# Patient Record
Sex: FEMALE | Race: WHITE | NOT HISPANIC OR LATINO
[De-identification: names, ages, dates, MRNs, and addresses within clinical notes are randomized per-mention and may not be internally consistent; named-entity substitution may affect disease eponyms.]

---

## 2020-05-20 ENCOUNTER — TRANSCRIPTION ENCOUNTER (OUTPATIENT)
Age: 60
End: 2020-05-20

## 2020-07-10 ENCOUNTER — APPOINTMENT (OUTPATIENT)
Dept: OBGYN | Facility: CLINIC | Age: 60
End: 2020-07-10
Payer: COMMERCIAL

## 2020-07-10 VITALS
SYSTOLIC BLOOD PRESSURE: 130 MMHG | TEMPERATURE: 98.5 F | WEIGHT: 160 LBS | HEIGHT: 64 IN | BODY MASS INDEX: 27.31 KG/M2 | DIASTOLIC BLOOD PRESSURE: 80 MMHG

## 2020-07-10 DIAGNOSIS — Z86.2 PERSONAL HISTORY OF DISEASES OF THE BLOOD AND BLOOD-FORMING ORGANS AND CERTAIN DISORDERS INVOLVING THE IMMUNE MECHANISM: ICD-10-CM

## 2020-07-10 DIAGNOSIS — Z12.31 ENCOUNTER FOR SCREENING MAMMOGRAM FOR MALIGNANT NEOPLASM OF BREAST: ICD-10-CM

## 2020-07-10 DIAGNOSIS — Z86.79 PERSONAL HISTORY OF OTHER DISEASES OF THE CIRCULATORY SYSTEM: ICD-10-CM

## 2020-07-10 DIAGNOSIS — I49.1 ATRIAL PREMATURE DEPOLARIZATION: ICD-10-CM

## 2020-07-10 PROCEDURE — 99396 PREV VISIT EST AGE 40-64: CPT

## 2020-07-12 NOTE — CHIEF COMPLAINT
[Initial Visit] : initial GYN visit [FreeTextEntry1] : 61 y/o patient presents for annual visit with LMP: 2011. Pt states her last pap was "about a year and a half ago"

## 2020-07-12 NOTE — CHIEF COMPLAINT
[Initial Visit] : initial GYN visit [FreeTextEntry1] : 59 y/o patient presents for annual visit with LMP: 2011. Pt states her last pap was "about a year and a half ago"

## 2020-07-16 LAB — HPV HIGH+LOW RISK DNA PNL CVX: NOT DETECTED

## 2020-07-24 ENCOUNTER — TRANSCRIPTION ENCOUNTER (OUTPATIENT)
Age: 60
End: 2020-07-24

## 2021-12-10 ENCOUNTER — NON-APPOINTMENT (OUTPATIENT)
Age: 61
End: 2021-12-10

## 2021-12-21 ENCOUNTER — APPOINTMENT (OUTPATIENT)
Dept: SURGICAL ONCOLOGY | Facility: CLINIC | Age: 61
End: 2021-12-21
Payer: COMMERCIAL

## 2021-12-21 ENCOUNTER — NON-APPOINTMENT (OUTPATIENT)
Age: 61
End: 2021-12-21

## 2021-12-21 ENCOUNTER — APPOINTMENT (OUTPATIENT)
Dept: OBGYN | Facility: CLINIC | Age: 61
End: 2021-12-21
Payer: COMMERCIAL

## 2021-12-21 VITALS
HEIGHT: 64 IN | BODY MASS INDEX: 28.17 KG/M2 | OXYGEN SATURATION: 98 % | HEART RATE: 85 BPM | SYSTOLIC BLOOD PRESSURE: 116 MMHG | DIASTOLIC BLOOD PRESSURE: 84 MMHG | WEIGHT: 165 LBS

## 2021-12-21 VITALS
SYSTOLIC BLOOD PRESSURE: 110 MMHG | WEIGHT: 178 LBS | HEIGHT: 64 IN | BODY MASS INDEX: 30.39 KG/M2 | DIASTOLIC BLOOD PRESSURE: 80 MMHG

## 2021-12-21 DIAGNOSIS — K58.9 IRRITABLE BOWEL SYNDROME W/OUT DIARRHEA: ICD-10-CM

## 2021-12-21 DIAGNOSIS — Z87.19 PERSONAL HISTORY OF OTHER DISEASES OF THE DIGESTIVE SYSTEM: ICD-10-CM

## 2021-12-21 DIAGNOSIS — Z86.59 PERSONAL HISTORY OF OTHER MENTAL AND BEHAVIORAL DISORDERS: ICD-10-CM

## 2021-12-21 PROCEDURE — 99396 PREV VISIT EST AGE 40-64: CPT

## 2021-12-21 PROCEDURE — 99204 OFFICE O/P NEW MOD 45 MIN: CPT

## 2021-12-21 RX ORDER — CLONAZEPAM 0.5 MG/1
0.5 TABLET ORAL
Refills: 0 | Status: ACTIVE | COMMUNITY

## 2021-12-21 RX ORDER — ASPIRIN 81 MG/1
81 TABLET, COATED ORAL
Refills: 0 | Status: ACTIVE | COMMUNITY

## 2021-12-21 RX ORDER — HYDROXYUREA 500 MG/1
500 CAPSULE ORAL
Refills: 0 | Status: ACTIVE | COMMUNITY

## 2021-12-21 NOTE — HISTORY OF PRESENT ILLNESS
[FreeTextEntry1] : 60 yo  with LMP age 51 for annual\par \par CC: lower abdominal pain and cramping. concern over other problem\par \par Known IBS feels different\par Known hemangioma of liver 5 x 5 x 7 cm noted on CT scan. Will be having surgery with Dr Willis Zarco.\par \par OB:\par c/s x 3 (ovary wrapped and behind uterus)\par Fibroid uterus - possible reason for c/s\par \par No HRT\par Menopause 54\par Menarche 13\par \par \par BMs ~2-3\par No CASSI nor changes in urination\par \par  [ColonoscopyDate] : 11/3 2020 [TextBox_43] : serrated aenoma, sessile right sided

## 2021-12-21 NOTE — PHYSICAL EXAM
[Labia Majora] : normal [Labia Minora] : normal [Normal] : normal [Uterine Adnexae] : normal [FreeTextEntry9] : Ramin negative

## 2021-12-21 NOTE — DISCUSSION/SUMMARY
[FreeTextEntry1] : Health Maintenance:\par -Pap with HPV\par -Mammo Rx\par -TBSE\par -colonoscopy guidelines reviewed with patient\par -Achieve Vit D levels of 30-40, intake of 1100 mg daily calcium mostly thru dark green\par leafy greens and milk products, exercise 30 minutes TIW\par \par IBS / lower abdominal pain:\par Feeling mild cramping\par Reviewed CT scan\par US pelvis ordered\par Continue gluten free diet which has helped\par Will have telemed visit for review preferable before her liver surgery

## 2022-01-03 NOTE — HISTORY OF PRESENT ILLNESS
[de-identified] : Ms. ARNOLD BERG is a 61 year old female who presents today for an initial consultation. PMH of HTN, HLD, IBS, polycythemia vera who was recently seen by her GI with complaints of abdominal gas pain and diarrhea and underwent a CT 11/16/21 that noted a prominent cyst in the right lobe of the liver measuring 5.2 x 5.5 x 7.2 cm causing mass effect on the adjacent structures and causing biliary dilation primarily in the right hepatic lobe. Low attenuating focus in the left hepatic lobe which corresponds to the MR finding from 2010 of a hemangioma. Colonic diverticulosis. No bowel wall thickening or inflammatory changes. Small noninflamed fat-containing umbilical hernia. 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 2 mm pancreatic neck cystic focus and a cluster of 2 to 4 mm cystic focus in the tail without suspicious features may represent SBIPMN vs serous cystadenoma. \par \par She recently gave up gluten and her abdominal symptoms have almost completely resolved. She has not been allergy tested yet. FHx of paternal first cousin with breast cancer, paternal first cousin with PDAC, paternal first cousin with colon cancer.

## 2022-01-03 NOTE — ASSESSMENT
[FreeTextEntry1] : Ms. ARNOLD BERG is a 61 year old female who presents today for an initial consultation. PMH of HTN, HLD, IBS, polycythemia vera who was recently seen by her GI with complaints of abdominal gas pain and diarrhea. She actually recently gave up gluten and her abdominal symptoms have almost completely resolved.\par \par During this workup she underwent a CT 11/16/21 that I reviewed and demonstrates a prominent cyst in the right lobe of the liver measuring 5.2 x 5.5 x 7.2 cm causing mass effect on the adjacent structures and causing biliary dilation primarily in the right hepatic lobe. Low attenuating focus in the left hepatic lobe which corresponds to the MR finding from 2010 of a hemangioma. 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 Discussed that these are incredibly rare lesions that are most often benign, however, they do have the potential for malignant degeneration into biliary cystadenocarcinomas and the recommendation would be for resection.  I do believe that this was an incidental finding on workup and is unrelated to her symptoms which have resolved since eliminating gluten. \par \par Discussed that up to 80% of the liver can be removed and the rest of the liver will increase in size to the same volume, discussed that the majority of regeneration will be complete in about 3 weeks although a small portion of regeneration continues for another few months. Discussed that the risk of liver insufficiency is <2%. Discussed that the surgery will last 3-4 hours and the hospital stay will last about 5 days. Discussed that the most common complaint is fatigue for about 6-8 weeks. Risks, benefits and details of operation discussed. These include bleeding, infection, damage to surrounding structures, heart/lung failure, pneumonia, liver insufficiency, bilomas and blood clot to the lung. The patient wishes to move forward with the operation. Our office will call to schedule surgery. She has a hx of polycythemia vera and should see her hematologist for clearance prior to surgery. \par \par Today, I personally spent 60 minutes in total time including reviewing imaging and studies, discussing complex treatment regimens, direct face to face time with the patient, patient education and counseling.\par  \par

## 2022-03-07 ENCOUNTER — OUTPATIENT (OUTPATIENT)
Dept: OUTPATIENT SERVICES | Facility: HOSPITAL | Age: 62
LOS: 1 days | End: 2022-03-07
Payer: MEDICARE

## 2022-03-07 ENCOUNTER — RESULT REVIEW (OUTPATIENT)
Age: 62
End: 2022-03-07

## 2022-03-07 DIAGNOSIS — D12.6 BENIGN NEOPLASM OF COLON, UNSPECIFIED: ICD-10-CM

## 2022-03-07 PROCEDURE — 88321 CONSLTJ&REPRT SLD PREP ELSWR: CPT

## 2022-03-09 LAB — SURGICAL PATHOLOGY STUDY: SIGNIFICANT CHANGE UP

## 2022-03-11 ENCOUNTER — OUTPATIENT (OUTPATIENT)
Dept: OUTPATIENT SERVICES | Facility: HOSPITAL | Age: 62
LOS: 1 days | End: 2022-03-11
Payer: COMMERCIAL

## 2022-03-11 VITALS
HEART RATE: 180 BPM | OXYGEN SATURATION: 98 % | DIASTOLIC BLOOD PRESSURE: 80 MMHG | WEIGHT: 179.9 LBS | HEIGHT: 64 IN | SYSTOLIC BLOOD PRESSURE: 140 MMHG | RESPIRATION RATE: 16 BRPM | TEMPERATURE: 98 F

## 2022-03-11 DIAGNOSIS — D75.1 SECONDARY POLYCYTHEMIA: ICD-10-CM

## 2022-03-11 DIAGNOSIS — Z90.89 ACQUIRED ABSENCE OF OTHER ORGANS: Chronic | ICD-10-CM

## 2022-03-11 DIAGNOSIS — Z98.891 HISTORY OF UTERINE SCAR FROM PREVIOUS SURGERY: Chronic | ICD-10-CM

## 2022-03-11 DIAGNOSIS — K83.5 BILIARY CYST: ICD-10-CM

## 2022-03-11 DIAGNOSIS — D45 POLYCYTHEMIA VERA: ICD-10-CM

## 2022-03-11 LAB
ALBUMIN SERPL ELPH-MCNC: 4.3 G/DL — SIGNIFICANT CHANGE UP (ref 3.3–5)
ALP SERPL-CCNC: 81 U/L — SIGNIFICANT CHANGE UP (ref 40–120)
ALT FLD-CCNC: 14 U/L — SIGNIFICANT CHANGE UP (ref 4–33)
ANION GAP SERPL CALC-SCNC: 14 MMOL/L — SIGNIFICANT CHANGE UP (ref 7–14)
APTT BLD: 32.5 SEC — SIGNIFICANT CHANGE UP (ref 27–36.3)
AST SERPL-CCNC: 21 U/L — SIGNIFICANT CHANGE UP (ref 4–32)
BILIRUB SERPL-MCNC: 0.5 MG/DL — SIGNIFICANT CHANGE UP (ref 0.2–1.2)
BLD GP AB SCN SERPL QL: NEGATIVE — SIGNIFICANT CHANGE UP
BUN SERPL-MCNC: 18 MG/DL — SIGNIFICANT CHANGE UP (ref 7–23)
CALCIUM SERPL-MCNC: 9.7 MG/DL — SIGNIFICANT CHANGE UP (ref 8.4–10.5)
CHLORIDE SERPL-SCNC: 102 MMOL/L — SIGNIFICANT CHANGE UP (ref 98–107)
CO2 SERPL-SCNC: 24 MMOL/L — SIGNIFICANT CHANGE UP (ref 22–31)
CREAT SERPL-MCNC: 0.68 MG/DL — SIGNIFICANT CHANGE UP (ref 0.5–1.3)
EGFR: 99 ML/MIN/1.73M2 — SIGNIFICANT CHANGE UP
GLUCOSE SERPL-MCNC: 78 MG/DL — SIGNIFICANT CHANGE UP (ref 70–99)
HCT VFR BLD CALC: 42.8 % — SIGNIFICANT CHANGE UP (ref 34.5–45)
HGB BLD-MCNC: 14.4 G/DL — SIGNIFICANT CHANGE UP (ref 11.5–15.5)
INR BLD: 0.95 RATIO — SIGNIFICANT CHANGE UP (ref 0.88–1.16)
MCHC RBC-ENTMCNC: 31.8 PG — SIGNIFICANT CHANGE UP (ref 27–34)
MCHC RBC-ENTMCNC: 33.6 GM/DL — SIGNIFICANT CHANGE UP (ref 32–36)
MCV RBC AUTO: 94.5 FL — SIGNIFICANT CHANGE UP (ref 80–100)
NRBC # BLD: 0 /100 WBCS — SIGNIFICANT CHANGE UP
NRBC # FLD: 0 K/UL — SIGNIFICANT CHANGE UP
PLATELET # BLD AUTO: 440 K/UL — HIGH (ref 150–400)
POTASSIUM SERPL-MCNC: 4.5 MMOL/L — SIGNIFICANT CHANGE UP (ref 3.5–5.3)
POTASSIUM SERPL-SCNC: 4.5 MMOL/L — SIGNIFICANT CHANGE UP (ref 3.5–5.3)
PROT SERPL-MCNC: 6.7 G/DL — SIGNIFICANT CHANGE UP (ref 6–8.3)
PROTHROM AB SERPL-ACNC: 11 SEC — SIGNIFICANT CHANGE UP (ref 10.5–13.4)
RBC # BLD: 4.53 M/UL — SIGNIFICANT CHANGE UP (ref 3.8–5.2)
RBC # FLD: 12.8 % — SIGNIFICANT CHANGE UP (ref 10.3–14.5)
RH IG SCN BLD-IMP: POSITIVE — SIGNIFICANT CHANGE UP
SODIUM SERPL-SCNC: 140 MMOL/L — SIGNIFICANT CHANGE UP (ref 135–145)
WBC # BLD: 5.73 K/UL — SIGNIFICANT CHANGE UP (ref 3.8–10.5)
WBC # FLD AUTO: 5.73 K/UL — SIGNIFICANT CHANGE UP (ref 3.8–10.5)

## 2022-03-11 PROCEDURE — 93010 ELECTROCARDIOGRAM REPORT: CPT

## 2022-03-11 NOTE — H&P PST ADULT - PROBLEM SELECTOR PLAN 1
Patient tentatively scheduled for  liver resection on 03/16/2022.  Pre-op instructions provided. Pt given verbal and written instructions with teach back on chlorhexidine shampoo and pepcid. Pt verbalized understanding with return demonstration.  Pt strongly advised to follow up with surgeon to discuss COVID testing requirements prior to procedure.     Requesting cardiology clearance due to Abnormal EKG at PST.  Pt was given Dr Zac Newton's ( cardiologist) office number as currently pt says she has no PCP or Cardiologist.

## 2022-03-11 NOTE — H&P PST ADULT - GASTROINTESTINAL COMMENTS
Pt states she had bad IBS , CT scan done and was showed Biliary cyst Pt states she had bad IBS , CT scan done and was revealed Biliary cyst Pre op dx- Biliary cyst

## 2022-03-11 NOTE — H&P PST ADULT - PROBLEM SELECTOR PLAN 2
Hematologist clearance copy in chart .   As per hematologist  pt stopped taking aspirin on 03/09/2022  Emailed Surgeon regarding hematologist's recommendation  to start Lovenox 40mg SQ daily while hospitalized ,  resume aspirin on discharge and If her hemoglobin is normal , she should resume hydroxyurea upon discharge .

## 2022-03-15 ENCOUNTER — TRANSCRIPTION ENCOUNTER (OUTPATIENT)
Age: 62
End: 2022-03-15

## 2022-03-15 NOTE — ASU PATIENT PROFILE, ADULT - FALL HARM RISK - UNIVERSAL INTERVENTIONS
Complex Repair And Flap Additional Text (Will Appearing After The Standard Complex Repair Text): The complex repair was not sufficient to completely close the primary defect. The remaining additional defect was repaired with the flap mentioned below. Bed in lowest position, wheels locked, appropriate side rails in place/Call bell, personal items and telephone in reach/Instruct patient to call for assistance before getting out of bed or chair/Non-slip footwear when patient is out of bed/[place] to call system/Physically safe environment - no spills, clutter or unnecessary equipment/Purposeful Proactive Rounding/Room/bathroom lighting operational, light cord in reach

## 2022-03-16 ENCOUNTER — APPOINTMENT (OUTPATIENT)
Dept: SURGICAL ONCOLOGY | Facility: HOSPITAL | Age: 62
End: 2022-03-16

## 2022-03-16 ENCOUNTER — INPATIENT (INPATIENT)
Facility: HOSPITAL | Age: 62
LOS: 3 days | Discharge: HOME | End: 2022-03-20
Attending: SURGERY | Admitting: SURGERY
Payer: COMMERCIAL

## 2022-03-16 ENCOUNTER — RESULT REVIEW (OUTPATIENT)
Age: 62
End: 2022-03-16

## 2022-03-16 VITALS
DIASTOLIC BLOOD PRESSURE: 92 MMHG | TEMPERATURE: 98 F | WEIGHT: 179.9 LBS | HEIGHT: 64 IN | RESPIRATION RATE: 16 BRPM | SYSTOLIC BLOOD PRESSURE: 140 MMHG | OXYGEN SATURATION: 96 % | HEART RATE: 76 BPM

## 2022-03-16 DIAGNOSIS — K83.5 BILIARY CYST: ICD-10-CM

## 2022-03-16 DIAGNOSIS — Z98.891 HISTORY OF UTERINE SCAR FROM PREVIOUS SURGERY: Chronic | ICD-10-CM

## 2022-03-16 DIAGNOSIS — Z90.89 ACQUIRED ABSENCE OF OTHER ORGANS: Chronic | ICD-10-CM

## 2022-03-16 LAB
ALBUMIN SERPL ELPH-MCNC: 3.7 G/DL — SIGNIFICANT CHANGE UP (ref 3.3–5)
ALBUMIN SERPL ELPH-MCNC: 4.3 G/DL — SIGNIFICANT CHANGE UP (ref 3.3–5)
ALP SERPL-CCNC: 70 U/L — SIGNIFICANT CHANGE UP (ref 40–120)
ALP SERPL-CCNC: 83 U/L — SIGNIFICANT CHANGE UP (ref 40–120)
ALT FLD-CCNC: 154 U/L — HIGH (ref 4–33)
ALT FLD-CCNC: 86 U/L — HIGH (ref 4–33)
ANION GAP SERPL CALC-SCNC: 11 MMOL/L — SIGNIFICANT CHANGE UP (ref 7–14)
ANION GAP SERPL CALC-SCNC: 12 MMOL/L — SIGNIFICANT CHANGE UP (ref 7–14)
APTT BLD: 29.7 SEC — SIGNIFICANT CHANGE UP (ref 27–36.3)
AST SERPL-CCNC: 113 U/L — HIGH (ref 4–32)
AST SERPL-CCNC: 226 U/L — HIGH (ref 4–32)
BILIRUB DIRECT SERPL-MCNC: 0.3 MG/DL — SIGNIFICANT CHANGE UP (ref 0–0.3)
BILIRUB INDIRECT FLD-MCNC: 1 MG/DL — SIGNIFICANT CHANGE UP (ref 0–1)
BILIRUB SERPL-MCNC: 1.3 MG/DL — HIGH (ref 0.2–1.2)
BILIRUB SERPL-MCNC: 1.6 MG/DL — HIGH (ref 0.2–1.2)
BUN SERPL-MCNC: 15 MG/DL — SIGNIFICANT CHANGE UP (ref 7–23)
BUN SERPL-MCNC: 16 MG/DL — SIGNIFICANT CHANGE UP (ref 7–23)
CALCIUM SERPL-MCNC: 8.8 MG/DL — SIGNIFICANT CHANGE UP (ref 8.4–10.5)
CALCIUM SERPL-MCNC: 8.9 MG/DL — SIGNIFICANT CHANGE UP (ref 8.4–10.5)
CHLORIDE SERPL-SCNC: 105 MMOL/L — SIGNIFICANT CHANGE UP (ref 98–107)
CHLORIDE SERPL-SCNC: 105 MMOL/L — SIGNIFICANT CHANGE UP (ref 98–107)
CO2 SERPL-SCNC: 21 MMOL/L — LOW (ref 22–31)
CO2 SERPL-SCNC: 24 MMOL/L — SIGNIFICANT CHANGE UP (ref 22–31)
CREAT SERPL-MCNC: 0.65 MG/DL — SIGNIFICANT CHANGE UP (ref 0.5–1.3)
CREAT SERPL-MCNC: 0.69 MG/DL — SIGNIFICANT CHANGE UP (ref 0.5–1.3)
EGFR: 100 ML/MIN/1.73M2 — SIGNIFICANT CHANGE UP
EGFR: 99 ML/MIN/1.73M2 — SIGNIFICANT CHANGE UP
GAS PNL BLDA: SIGNIFICANT CHANGE UP
GAS PNL BLDA: SIGNIFICANT CHANGE UP
GLUCOSE SERPL-MCNC: 146 MG/DL — HIGH (ref 70–99)
GLUCOSE SERPL-MCNC: 148 MG/DL — HIGH (ref 70–99)
HCT VFR BLD CALC: 39.5 % — SIGNIFICANT CHANGE UP (ref 34.5–45)
HCT VFR BLD CALC: 43.6 % — SIGNIFICANT CHANGE UP (ref 34.5–45)
HGB BLD-MCNC: 13.4 G/DL — SIGNIFICANT CHANGE UP (ref 11.5–15.5)
HGB BLD-MCNC: 14.5 G/DL — SIGNIFICANT CHANGE UP (ref 11.5–15.5)
INR BLD: 1.04 RATIO — SIGNIFICANT CHANGE UP (ref 0.88–1.16)
MAGNESIUM SERPL-MCNC: 2 MG/DL — SIGNIFICANT CHANGE UP (ref 1.6–2.6)
MAGNESIUM SERPL-MCNC: 2.1 MG/DL — SIGNIFICANT CHANGE UP (ref 1.6–2.6)
MCHC RBC-ENTMCNC: 31.8 PG — SIGNIFICANT CHANGE UP (ref 27–34)
MCHC RBC-ENTMCNC: 31.9 PG — SIGNIFICANT CHANGE UP (ref 27–34)
MCHC RBC-ENTMCNC: 33.3 GM/DL — SIGNIFICANT CHANGE UP (ref 32–36)
MCHC RBC-ENTMCNC: 33.9 GM/DL — SIGNIFICANT CHANGE UP (ref 32–36)
MCV RBC AUTO: 94 FL — SIGNIFICANT CHANGE UP (ref 80–100)
MCV RBC AUTO: 95.6 FL — SIGNIFICANT CHANGE UP (ref 80–100)
NRBC # BLD: 0 /100 WBCS — SIGNIFICANT CHANGE UP
NRBC # BLD: 0 /100 WBCS — SIGNIFICANT CHANGE UP
NRBC # FLD: 0 K/UL — SIGNIFICANT CHANGE UP
NRBC # FLD: 0 K/UL — SIGNIFICANT CHANGE UP
PHOSPHATE SERPL-MCNC: 3.4 MG/DL — SIGNIFICANT CHANGE UP (ref 2.5–4.5)
PHOSPHATE SERPL-MCNC: 4.9 MG/DL — HIGH (ref 2.5–4.5)
PLATELET # BLD AUTO: 315 K/UL — SIGNIFICANT CHANGE UP (ref 150–400)
PLATELET # BLD AUTO: 378 K/UL — SIGNIFICANT CHANGE UP (ref 150–400)
POTASSIUM SERPL-MCNC: 3.4 MMOL/L — LOW (ref 3.5–5.3)
POTASSIUM SERPL-MCNC: 5.3 MMOL/L — SIGNIFICANT CHANGE UP (ref 3.5–5.3)
POTASSIUM SERPL-SCNC: 3.4 MMOL/L — LOW (ref 3.5–5.3)
POTASSIUM SERPL-SCNC: 5.3 MMOL/L — SIGNIFICANT CHANGE UP (ref 3.5–5.3)
PROT SERPL-MCNC: 5.8 G/DL — LOW (ref 6–8.3)
PROT SERPL-MCNC: 6.6 G/DL — SIGNIFICANT CHANGE UP (ref 6–8.3)
PROTHROM AB SERPL-ACNC: 12.1 SEC — SIGNIFICANT CHANGE UP (ref 10.5–13.4)
RBC # BLD: 4.2 M/UL — SIGNIFICANT CHANGE UP (ref 3.8–5.2)
RBC # BLD: 4.56 M/UL — SIGNIFICANT CHANGE UP (ref 3.8–5.2)
RBC # FLD: 13 % — SIGNIFICANT CHANGE UP (ref 10.3–14.5)
RBC # FLD: 13.1 % — SIGNIFICANT CHANGE UP (ref 10.3–14.5)
SODIUM SERPL-SCNC: 138 MMOL/L — SIGNIFICANT CHANGE UP (ref 135–145)
SODIUM SERPL-SCNC: 140 MMOL/L — SIGNIFICANT CHANGE UP (ref 135–145)
WBC # BLD: 10.96 K/UL — HIGH (ref 3.8–10.5)
WBC # BLD: 8.14 K/UL — SIGNIFICANT CHANGE UP (ref 3.8–10.5)
WBC # FLD AUTO: 10.96 K/UL — HIGH (ref 3.8–10.5)
WBC # FLD AUTO: 8.14 K/UL — SIGNIFICANT CHANGE UP (ref 3.8–10.5)

## 2022-03-16 PROCEDURE — 88313 SPECIAL STAINS GROUP 2: CPT | Mod: 26

## 2022-03-16 PROCEDURE — 88360 TUMOR IMMUNOHISTOCHEM/MANUAL: CPT | Mod: 26

## 2022-03-16 PROCEDURE — 88309 TISSUE EXAM BY PATHOLOGIST: CPT | Mod: 26

## 2022-03-16 PROCEDURE — 88304 TISSUE EXAM BY PATHOLOGIST: CPT | Mod: 26

## 2022-03-16 PROCEDURE — 88341 IMHCHEM/IMCYTCHM EA ADD ANTB: CPT | Mod: 26,59

## 2022-03-16 PROCEDURE — 88342 IMHCHEM/IMCYTCHM 1ST ANTB: CPT | Mod: 26,59

## 2022-03-16 PROCEDURE — 47130 PARTIAL REMOVAL OF LIVER: CPT

## 2022-03-16 PROCEDURE — 88305 TISSUE EXAM BY PATHOLOGIST: CPT | Mod: 26

## 2022-03-16 PROCEDURE — 93010 ELECTROCARDIOGRAM REPORT: CPT

## 2022-03-16 DEVICE — LIGATING CLIPS WECK HORIZON MEDIUM (BLUE) 24: Type: IMPLANTABLE DEVICE | Status: FUNCTIONAL

## 2022-03-16 DEVICE — STAPLER ETHICON ECHELON ENDOPATH GRIP SURFACE 60MM WHITE RELOAD: Type: IMPLANTABLE DEVICE | Status: FUNCTIONAL

## 2022-03-16 DEVICE — STAPLER ETHICON ECHELON ENDOPATH VASCULAR 35MM WHITE RELOAD: Type: IMPLANTABLE DEVICE | Status: FUNCTIONAL

## 2022-03-16 RX ORDER — ONDANSETRON 8 MG/1
4 TABLET, FILM COATED ORAL ONCE
Refills: 0 | Status: DISCONTINUED | OUTPATIENT
Start: 2022-03-16 | End: 2022-03-16

## 2022-03-16 RX ORDER — HYDROMORPHONE HYDROCHLORIDE 2 MG/ML
30 INJECTION INTRAMUSCULAR; INTRAVENOUS; SUBCUTANEOUS
Refills: 0 | Status: DISCONTINUED | OUTPATIENT
Start: 2022-03-16 | End: 2022-03-18

## 2022-03-16 RX ORDER — HYDROMORPHONE HYDROCHLORIDE 2 MG/ML
0.5 INJECTION INTRAMUSCULAR; INTRAVENOUS; SUBCUTANEOUS
Refills: 0 | Status: DISCONTINUED | OUTPATIENT
Start: 2022-03-16 | End: 2022-03-16

## 2022-03-16 RX ORDER — CLONAZEPAM 1 MG
1 TABLET ORAL
Qty: 0 | Refills: 0 | DISCHARGE

## 2022-03-16 RX ORDER — HYDROMORPHONE HYDROCHLORIDE 2 MG/ML
30 INJECTION INTRAMUSCULAR; INTRAVENOUS; SUBCUTANEOUS
Refills: 0 | Status: DISCONTINUED | OUTPATIENT
Start: 2022-03-16 | End: 2022-03-16

## 2022-03-16 RX ORDER — PHYTONADIONE (VIT K1) 5 MG
10 TABLET ORAL DAILY
Refills: 0 | Status: DISCONTINUED | OUTPATIENT
Start: 2022-03-16 | End: 2022-03-16

## 2022-03-16 RX ORDER — PHYTONADIONE (VIT K1) 5 MG
10 TABLET ORAL DAILY
Refills: 0 | Status: COMPLETED | OUTPATIENT
Start: 2022-03-16 | End: 2022-03-18

## 2022-03-16 RX ORDER — LANOLIN ALCOHOL/MO/W.PET/CERES
1 CREAM (GRAM) TOPICAL
Qty: 0 | Refills: 0 | DISCHARGE

## 2022-03-16 RX ORDER — ENOXAPARIN SODIUM 100 MG/ML
40 INJECTION SUBCUTANEOUS EVERY 24 HOURS
Refills: 0 | Status: DISCONTINUED | OUTPATIENT
Start: 2022-03-17 | End: 2022-03-20

## 2022-03-16 RX ORDER — DIPHENHYDRAMINE HCL 50 MG
12.5 CAPSULE ORAL EVERY 4 HOURS
Refills: 0 | Status: DISCONTINUED | OUTPATIENT
Start: 2022-03-16 | End: 2022-03-20

## 2022-03-16 RX ORDER — HYDROMORPHONE HYDROCHLORIDE 2 MG/ML
0.5 INJECTION INTRAMUSCULAR; INTRAVENOUS; SUBCUTANEOUS
Refills: 0 | Status: DISCONTINUED | OUTPATIENT
Start: 2022-03-16 | End: 2022-03-17

## 2022-03-16 RX ORDER — ONDANSETRON 8 MG/1
4 TABLET, FILM COATED ORAL EVERY 6 HOURS
Refills: 0 | Status: DISCONTINUED | OUTPATIENT
Start: 2022-03-16 | End: 2022-03-20

## 2022-03-16 RX ORDER — PHYTONADIONE (VIT K1) 5 MG
10 TABLET ORAL EVERY 24 HOURS
Refills: 0 | Status: DISCONTINUED | OUTPATIENT
Start: 2022-03-16 | End: 2022-03-16

## 2022-03-16 RX ORDER — MORPHINE SULFATE 50 MG/1
0.15 CAPSULE, EXTENDED RELEASE ORAL ONCE
Refills: 0 | Status: DISCONTINUED | OUTPATIENT
Start: 2022-03-16 | End: 2022-03-19

## 2022-03-16 RX ORDER — HYDROXYUREA 500 MG/1
0 CAPSULE ORAL
Qty: 0 | Refills: 0 | DISCHARGE

## 2022-03-16 RX ORDER — HYDROMORPHONE HYDROCHLORIDE 2 MG/ML
0.25 INJECTION INTRAMUSCULAR; INTRAVENOUS; SUBCUTANEOUS
Refills: 0 | Status: DISCONTINUED | OUTPATIENT
Start: 2022-03-16 | End: 2022-03-16

## 2022-03-16 RX ORDER — SODIUM CHLORIDE 9 MG/ML
1000 INJECTION, SOLUTION INTRAVENOUS
Refills: 0 | Status: DISCONTINUED | OUTPATIENT
Start: 2022-03-16 | End: 2022-03-16

## 2022-03-16 RX ORDER — NALOXONE HYDROCHLORIDE 4 MG/.1ML
0.1 SPRAY NASAL
Refills: 0 | Status: DISCONTINUED | OUTPATIENT
Start: 2022-03-16 | End: 2022-03-19

## 2022-03-16 RX ORDER — NALOXONE HYDROCHLORIDE 4 MG/.1ML
0.1 SPRAY NASAL
Refills: 0 | Status: DISCONTINUED | OUTPATIENT
Start: 2022-03-16 | End: 2022-03-18

## 2022-03-16 RX ORDER — POTASSIUM CHLORIDE 20 MEQ
10 PACKET (EA) ORAL
Refills: 0 | Status: COMPLETED | OUTPATIENT
Start: 2022-03-16 | End: 2022-03-16

## 2022-03-16 RX ORDER — ONDANSETRON 8 MG/1
4 TABLET, FILM COATED ORAL EVERY 6 HOURS
Refills: 0 | Status: DISCONTINUED | OUTPATIENT
Start: 2022-03-16 | End: 2022-03-18

## 2022-03-16 RX ORDER — NALBUPHINE HYDROCHLORIDE 10 MG/ML
2.5 INJECTION, SOLUTION INTRAMUSCULAR; INTRAVENOUS; SUBCUTANEOUS EVERY 6 HOURS
Refills: 0 | Status: DISCONTINUED | OUTPATIENT
Start: 2022-03-16 | End: 2022-03-20

## 2022-03-16 RX ORDER — SODIUM CHLORIDE 9 MG/ML
1000 INJECTION INTRAMUSCULAR; INTRAVENOUS; SUBCUTANEOUS
Refills: 0 | Status: DISCONTINUED | OUTPATIENT
Start: 2022-03-16 | End: 2022-03-18

## 2022-03-16 RX ORDER — ASPIRIN/CALCIUM CARB/MAGNESIUM 324 MG
0 TABLET ORAL
Qty: 0 | Refills: 0 | DISCHARGE

## 2022-03-16 RX ADMIN — Medication 100 MILLIEQUIVALENT(S): at 14:34

## 2022-03-16 RX ADMIN — Medication 10 MILLIGRAM(S): at 14:25

## 2022-03-16 RX ADMIN — HYDROMORPHONE HYDROCHLORIDE 0.5 MILLIGRAM(S): 2 INJECTION INTRAMUSCULAR; INTRAVENOUS; SUBCUTANEOUS at 13:00

## 2022-03-16 RX ADMIN — Medication 100 MILLIEQUIVALENT(S): at 13:35

## 2022-03-16 RX ADMIN — HYDROMORPHONE HYDROCHLORIDE 30 MILLILITER(S): 2 INJECTION INTRAMUSCULAR; INTRAVENOUS; SUBCUTANEOUS at 12:45

## 2022-03-16 RX ADMIN — HYDROMORPHONE HYDROCHLORIDE 0.5 MILLIGRAM(S): 2 INJECTION INTRAMUSCULAR; INTRAVENOUS; SUBCUTANEOUS at 12:28

## 2022-03-16 RX ADMIN — HYDROMORPHONE HYDROCHLORIDE 0.5 MILLIGRAM(S): 2 INJECTION INTRAMUSCULAR; INTRAVENOUS; SUBCUTANEOUS at 12:12

## 2022-03-16 RX ADMIN — HYDROMORPHONE HYDROCHLORIDE 30 MILLILITER(S): 2 INJECTION INTRAMUSCULAR; INTRAVENOUS; SUBCUTANEOUS at 22:11

## 2022-03-16 RX ADMIN — HYDROMORPHONE HYDROCHLORIDE 30 MILLILITER(S): 2 INJECTION INTRAMUSCULAR; INTRAVENOUS; SUBCUTANEOUS at 18:30

## 2022-03-16 RX ADMIN — HYDROMORPHONE HYDROCHLORIDE 0.5 MILLIGRAM(S): 2 INJECTION INTRAMUSCULAR; INTRAVENOUS; SUBCUTANEOUS at 12:30

## 2022-03-16 RX ADMIN — SODIUM CHLORIDE 100 MILLILITER(S): 9 INJECTION INTRAMUSCULAR; INTRAVENOUS; SUBCUTANEOUS at 12:16

## 2022-03-16 RX ADMIN — Medication 100 MILLIEQUIVALENT(S): at 15:30

## 2022-03-16 NOTE — BRIEF OPERATIVE NOTE - NSICDXBRIEFPROCEDURE_GEN_ALL_CORE_FT
PROCEDURES:  Resection, liver, segmental 16-Mar-2022 20:41:01  Clark Oliva  Open cholecystectomy 16-Mar-2022 20:41:18  Clark Oliva

## 2022-03-17 LAB
ALBUMIN SERPL ELPH-MCNC: 3.5 G/DL — SIGNIFICANT CHANGE UP (ref 3.3–5)
ALBUMIN SERPL ELPH-MCNC: 3.8 G/DL — SIGNIFICANT CHANGE UP (ref 3.3–5)
ALP SERPL-CCNC: 71 U/L — SIGNIFICANT CHANGE UP (ref 40–120)
ALP SERPL-CCNC: 75 U/L — SIGNIFICANT CHANGE UP (ref 40–120)
ALT FLD-CCNC: 269 U/L — HIGH (ref 4–33)
ALT FLD-CCNC: 288 U/L — HIGH (ref 4–33)
ANION GAP SERPL CALC-SCNC: 9 MMOL/L — SIGNIFICANT CHANGE UP (ref 7–14)
ANION GAP SERPL CALC-SCNC: 9 MMOL/L — SIGNIFICANT CHANGE UP (ref 7–14)
APTT BLD: 31.3 SEC — SIGNIFICANT CHANGE UP (ref 27–36.3)
AST SERPL-CCNC: 298 U/L — HIGH (ref 4–32)
AST SERPL-CCNC: 333 U/L — HIGH (ref 4–32)
BILIRUB SERPL-MCNC: 1.8 MG/DL — HIGH (ref 0.2–1.2)
BILIRUB SERPL-MCNC: 2.5 MG/DL — HIGH (ref 0.2–1.2)
BUN SERPL-MCNC: 12 MG/DL — SIGNIFICANT CHANGE UP (ref 7–23)
BUN SERPL-MCNC: 13 MG/DL — SIGNIFICANT CHANGE UP (ref 7–23)
CALCIUM SERPL-MCNC: 8.5 MG/DL — SIGNIFICANT CHANGE UP (ref 8.4–10.5)
CALCIUM SERPL-MCNC: 8.6 MG/DL — SIGNIFICANT CHANGE UP (ref 8.4–10.5)
CHLORIDE SERPL-SCNC: 100 MMOL/L — SIGNIFICANT CHANGE UP (ref 98–107)
CHLORIDE SERPL-SCNC: 105 MMOL/L — SIGNIFICANT CHANGE UP (ref 98–107)
CO2 SERPL-SCNC: 23 MMOL/L — SIGNIFICANT CHANGE UP (ref 22–31)
CO2 SERPL-SCNC: 26 MMOL/L — SIGNIFICANT CHANGE UP (ref 22–31)
CREAT SERPL-MCNC: 0.6 MG/DL — SIGNIFICANT CHANGE UP (ref 0.5–1.3)
CREAT SERPL-MCNC: 0.6 MG/DL — SIGNIFICANT CHANGE UP (ref 0.5–1.3)
EGFR: 102 ML/MIN/1.73M2 — SIGNIFICANT CHANGE UP
EGFR: 102 ML/MIN/1.73M2 — SIGNIFICANT CHANGE UP
GLUCOSE SERPL-MCNC: 101 MG/DL — HIGH (ref 70–99)
GLUCOSE SERPL-MCNC: 107 MG/DL — HIGH (ref 70–99)
HCT VFR BLD CALC: 40.3 % — SIGNIFICANT CHANGE UP (ref 34.5–45)
HCT VFR BLD CALC: 41.5 % — SIGNIFICANT CHANGE UP (ref 34.5–45)
HGB BLD-MCNC: 13.1 G/DL — SIGNIFICANT CHANGE UP (ref 11.5–15.5)
HGB BLD-MCNC: 13.6 G/DL — SIGNIFICANT CHANGE UP (ref 11.5–15.5)
INR BLD: 1.2 RATIO — HIGH (ref 0.88–1.16)
MAGNESIUM SERPL-MCNC: 2.1 MG/DL — SIGNIFICANT CHANGE UP (ref 1.6–2.6)
MAGNESIUM SERPL-MCNC: 2.1 MG/DL — SIGNIFICANT CHANGE UP (ref 1.6–2.6)
MCHC RBC-ENTMCNC: 31.8 PG — SIGNIFICANT CHANGE UP (ref 27–34)
MCHC RBC-ENTMCNC: 31.9 PG — SIGNIFICANT CHANGE UP (ref 27–34)
MCHC RBC-ENTMCNC: 32.5 GM/DL — SIGNIFICANT CHANGE UP (ref 32–36)
MCHC RBC-ENTMCNC: 32.8 GM/DL — SIGNIFICANT CHANGE UP (ref 32–36)
MCV RBC AUTO: 97 FL — SIGNIFICANT CHANGE UP (ref 80–100)
MCV RBC AUTO: 98.1 FL — SIGNIFICANT CHANGE UP (ref 80–100)
NRBC # BLD: 0 /100 WBCS — SIGNIFICANT CHANGE UP
NRBC # BLD: 0 /100 WBCS — SIGNIFICANT CHANGE UP
NRBC # FLD: 0 K/UL — SIGNIFICANT CHANGE UP
NRBC # FLD: 0 K/UL — SIGNIFICANT CHANGE UP
PHOSPHATE SERPL-MCNC: 1.8 MG/DL — LOW (ref 2.5–4.5)
PHOSPHATE SERPL-MCNC: 3.2 MG/DL — SIGNIFICANT CHANGE UP (ref 2.5–4.5)
PLATELET # BLD AUTO: 289 K/UL — SIGNIFICANT CHANGE UP (ref 150–400)
PLATELET # BLD AUTO: 315 K/UL — SIGNIFICANT CHANGE UP (ref 150–400)
POTASSIUM SERPL-MCNC: 4.5 MMOL/L — SIGNIFICANT CHANGE UP (ref 3.5–5.3)
POTASSIUM SERPL-MCNC: 4.7 MMOL/L — SIGNIFICANT CHANGE UP (ref 3.5–5.3)
POTASSIUM SERPL-SCNC: 4.5 MMOL/L — SIGNIFICANT CHANGE UP (ref 3.5–5.3)
POTASSIUM SERPL-SCNC: 4.7 MMOL/L — SIGNIFICANT CHANGE UP (ref 3.5–5.3)
PROT SERPL-MCNC: 5.7 G/DL — LOW (ref 6–8.3)
PROT SERPL-MCNC: 5.8 G/DL — LOW (ref 6–8.3)
PROTHROM AB SERPL-ACNC: 14 SEC — HIGH (ref 10.5–13.4)
RBC # BLD: 4.11 M/UL — SIGNIFICANT CHANGE UP (ref 3.8–5.2)
RBC # BLD: 4.28 M/UL — SIGNIFICANT CHANGE UP (ref 3.8–5.2)
RBC # FLD: 13 % — SIGNIFICANT CHANGE UP (ref 10.3–14.5)
RBC # FLD: 13.2 % — SIGNIFICANT CHANGE UP (ref 10.3–14.5)
SODIUM SERPL-SCNC: 135 MMOL/L — SIGNIFICANT CHANGE UP (ref 135–145)
SODIUM SERPL-SCNC: 137 MMOL/L — SIGNIFICANT CHANGE UP (ref 135–145)
WBC # BLD: 8.23 K/UL — SIGNIFICANT CHANGE UP (ref 3.8–10.5)
WBC # BLD: 8.78 K/UL — SIGNIFICANT CHANGE UP (ref 3.8–10.5)
WBC # FLD AUTO: 8.23 K/UL — SIGNIFICANT CHANGE UP (ref 3.8–10.5)
WBC # FLD AUTO: 8.78 K/UL — SIGNIFICANT CHANGE UP (ref 3.8–10.5)

## 2022-03-17 RX ORDER — HYDROMORPHONE HYDROCHLORIDE 2 MG/ML
0.25 INJECTION INTRAMUSCULAR; INTRAVENOUS; SUBCUTANEOUS
Refills: 0 | Status: DISCONTINUED | OUTPATIENT
Start: 2022-03-17 | End: 2022-03-19

## 2022-03-17 RX ORDER — SODIUM,POTASSIUM PHOSPHATES 278-250MG
2 POWDER IN PACKET (EA) ORAL ONCE
Refills: 0 | Status: COMPLETED | OUTPATIENT
Start: 2022-03-17 | End: 2022-03-17

## 2022-03-17 RX ADMIN — Medication 42.5 MILLIMOLE(S): at 21:40

## 2022-03-17 RX ADMIN — Medication 10 MILLIGRAM(S): at 11:10

## 2022-03-17 RX ADMIN — SODIUM CHLORIDE 100 MILLILITER(S): 9 INJECTION INTRAMUSCULAR; INTRAVENOUS; SUBCUTANEOUS at 07:35

## 2022-03-17 RX ADMIN — Medication 2 TABLET(S): at 20:17

## 2022-03-17 RX ADMIN — HYDROMORPHONE HYDROCHLORIDE 30 MILLILITER(S): 2 INJECTION INTRAMUSCULAR; INTRAVENOUS; SUBCUTANEOUS at 19:19

## 2022-03-17 RX ADMIN — ENOXAPARIN SODIUM 40 MILLIGRAM(S): 100 INJECTION SUBCUTANEOUS at 11:09

## 2022-03-17 RX ADMIN — HYDROMORPHONE HYDROCHLORIDE 30 MILLILITER(S): 2 INJECTION INTRAMUSCULAR; INTRAVENOUS; SUBCUTANEOUS at 07:36

## 2022-03-17 NOTE — PROGRESS NOTE ADULT - SUBJECTIVE AND OBJECTIVE BOX
SURGERY DAILY PROGRESS NOTE:     PROCEDURE: 3/16 right hepatectomy, cholecystectomy for liver mass    SUBJECTIVE/ROS: No acute events overnight. Patient seen and examined bedside on AM rounds.     OBJECTIVE:  Vital Signs Last 24 Hrs  T(C): 36.4 (17 Mar 2022 06:00), Max: 36.9 (16 Mar 2022 22:10)  T(F): 97.6 (17 Mar 2022 06:00), Max: 98.5 (16 Mar 2022 22:10)  HR: 66 (17 Mar 2022 06:00) (55 - 82)  BP: 128/68 (17 Mar 2022 06:00) (98/63 - 132/80)  BP(mean): 81 (16 Mar 2022 21:00) (68 - 101)  RR: 16 (17 Mar 2022 06:00) (10 - 18)  SpO2: 100% (17 Mar 2022 06:00) (94% - 100%)    PHYSICAL EXAM:  Constitutional: NAD  Respiratory: non-labored breathing, patent airway  Gastrointestinal: R sided hockey-stick incision with tenderness, nondistended  : brown in place  Extremities: warm  Neurological: intact                          13.1   8.78  )-----------( 315      ( 17 Mar 2022 06:31 )             40.3     03-16    138  |  105  |  15  ----------------------------<  146<H>  5.3   |  21<L>  |  0.65    Ca    8.9      16 Mar 2022 17:40  Phos  4.9     03-16  Mg     2.10     03-16    TPro  6.6  /  Alb  4.3  /  TBili  1.6<H>  /  DBili  x   /  AST  226<H>  /  ALT  154<H>  /  AlkPhos  83  03-16   PT/INR - ( 16 Mar 2022 12:30 )   PT: 12.1 sec;   INR: 1.04 ratio         PTT - ( 16 Mar 2022 12:30 )  PTT:29.7 sec  I&O's Detail    16 Mar 2022 07:01  -  17 Mar 2022 07:00  --------------------------------------------------------  IN:    Oral Fluid: 50 mL    sodium chloride 0.9%: 2000 mL  Total IN: 2050 mL    OUT:    Ureteral Catheter (mL): 1168 mL  Total OUT: 1168 mL    Total NET: 882 mL          IMAGING:               SURGERY DAILY PROGRESS NOTE:     3/16 right hepatectomy, cholecystectomy for liver mass.  No acute events overnight. Patient seen and examined bedside on AM rounds.     Vital Signs  T(C): 36.4 (17 Mar 2022 06:00), Max: 36.9 (16 Mar 2022 22:10)  T(F): 97.6 (17 Mar 2022 06:00), Max: 98.5 (16 Mar 2022 22:10)  HR: 66 (17 Mar 2022 06:00) (55 - 82)  BP: 128/68 (17 Mar 2022 06:00) (98/63 - 132/80)  BP(mean): 81 (16 Mar 2022 21:00) (68 - 101)  RR: 16 (17 Mar 2022 06:00) (10 - 18)  SpO2: 100% (17 Mar 2022 06:00) (94% - 100%)    PHYSICAL EXAM:  Constitutional: NAD  Respiratory: non-labored breathing, patent airway  Gastrointestinal: R sided hockey-stick incision with tenderness, nondistended  : brown in place  Extremities: warm  Neurological: intact               13.1   8.78  )-----------( 315      ( 17 Mar 2022 06:31 )             40.3   138  |  105  |  15  ----------------------------<  146<H>  5.3   |  21<L>  |  0.65  Ca    8.9      16 Mar 2022 17:40  Phos  4.9     03-16  Mg     2.10     03-16  TPro  6.6  /  Alb  4.3  /  TBili  1.6<H>  /  DBili  x   /  AST  226<H>  /  ALT  154<H>  /  AlkPhos  83  03-16  PT/INR - ( 16 Mar 2022 12:30 )   PT: 12.1 sec;   INR: 1.04 ratio    PTT - ( 16 Mar 2022 12:30 )  PTT:29.7 sec

## 2022-03-17 NOTE — CONSULT NOTE ADULT - SUBJECTIVE AND OBJECTIVE BOX
CHIEF COMPLAINT:Patient is a 62y old  Female who presents with a chief complaint of " I am having a liver resection " (11 Mar 2022 17:21)      HISTORY OF PRESENT ILLNESS:    62 female with history as below known to me from office is now s/p   Right hepatectomy, cholecystectomy  denies any chest pain, sob, palpitation, dizziness or syncope.     PAST MEDICAL & SURGICAL HISTORY:  Biliary cyst    H/O polycythemia vera  managed with Asa    H/O  section  x3    History of tonsillectomy            MEDICATIONS:  enoxaparin Injectable 40 milliGRAM(s) SubCutaneous every 24 hours      diphenhydrAMINE Injectable 12.5 milliGRAM(s) IV Push every 4 hours PRN    HYDROmorphone PCA (1 mG/mL) 30 milliLiter(s) PCA Continuous PCA Continuous  HYDROmorphone PCA (1 mG/mL) Rescue Clinician Bolus 0.5 milliGRAM(s) IV Push every 15 minutes PRN  morphine PF Spinal 0.15 milliGRAM(s) IntraThecal. once  nalbuphine Injectable 2.5 milliGRAM(s) IV Push every 6 hours PRN  ondansetron Injectable 4 milliGRAM(s) IV Push every 6 hours PRN  ondansetron Injectable 4 milliGRAM(s) IV Push every 6 hours PRN        phytonadione SubCutaneous Injection - Peds 10 milliGRAM(s) SubCutaneous daily  sodium chloride 0.9%. 1000 milliLiter(s) IV Continuous <Continuous>      FAMILY HISTORY:      Non-contributory    SOCIAL HISTORY:    No tobacco, drugs or etoh    Allergies    No Known Allergies    Intolerances    	    REVIEW OF SYSTEMS:  as above  The rest of the 14 points ROS reviewed and except above they are unremarkable.        PHYSICAL EXAM:  T(C): 36.6 (22 @ 10:00), Max: 36.9 (22 @ 22:10)  HR: 75 (22 @ 10:00) (55 - 82)  BP: 115/67 (22 @ 10:00) (98/63 - 132/80)  RR: 18 (22 @ 10:00) (10 - 18)  SpO2: 99% (22 @ 10:00) (94% - 100%)  Wt(kg): --  I&O's Summary    16 Mar 2022 07:01  -  17 Mar 2022 07:00  --------------------------------------------------------  IN: 2050 mL / OUT: 1168 mL / NET: 882 mL    17 Mar 2022 07:01  -  17 Mar 2022 11:27  --------------------------------------------------------  IN: 268 mL / OUT: 150 mL / NET: 118 mL        Appearance: Normal	  HEENT:   no gross abnormality   Cardiovascular: Normal S1 S2,    Murmur:   Neck: JVP normal  Respiratory: Lungs clear   Gastrointestinal:  Soft, Non-tender  Skin: normal   Neuro: No gross deficits.   Psychiatry:  Mood & affect flat  Ext: No edema    LABS/DATA:    TELEMETRY: 	    ECG:  	   	  CARDIAC MARKERS:                                      13.1   8.78  )-----------( 315      ( 17 Mar 2022 06:31 )             40.3     03-    137  |  105  |  13  ----------------------------<  101<H>  4.7   |  23  |  0.60    Ca    8.6      17 Mar 2022 06:31  Phos  3.2     -  Mg     2.10         TPro  5.7<L>  /  Alb  3.5  /  TBili  1.8<H>  /  DBili  x   /  AST  333<H>  /  ALT  269<H>  /  AlkPhos  71      proBNP:   Lipid Profile:   HgA1c:   TSH:

## 2022-03-17 NOTE — PROGRESS NOTE ADULT - SUBJECTIVE AND OBJECTIVE BOX
Anesthesia Pain Management Service    SUBJECTIVE: Patient states she is having shoulder pain and abdominal pain around the surgical site where it feels like she "did 300 sit-ups". Patient states the IV PCA helps her but she becomes very sleepy so the demand dose had to be lowered last night, and she is happy with the current dose. Patient has been tolerating small amounts of clears this morning with no nausea or vomiting.     Pain Scale Score	At rest: _5/10__ 	With Activity: ___ 	[X ] Refer to charted pain scores    THERAPY:    [ ] IV PCA Morphine		[ ] 5 mg/mL	[ ] 1 mg/mL  [X ] IV PCA Hydromorphone	[ ] 5 mg/mL	[X ] 1 mg/mL  [ ] IV PCA Fentanyl		[ ] 50 micrograms/mL    Demand dose __0.1_ lockout __10_ (minutes) Continuous Rate _0__ Total: __2.3_  mg used (in past 24 hours)      MEDICATIONS  (STANDING):  enoxaparin Injectable 40 milliGRAM(s) SubCutaneous every 24 hours  HYDROmorphone PCA (1 mG/mL) 30 milliLiter(s) PCA Continuous PCA Continuous  morphine PF Spinal 0.15 milliGRAM(s) IntraThecal. once  phytonadione SubCutaneous Injection - Peds 10 milliGRAM(s) SubCutaneous daily  sodium chloride 0.9%. 1000 milliLiter(s) (42 mL/Hr) IV Continuous <Continuous>    MEDICATIONS  (PRN):  diphenhydrAMINE Injectable 12.5 milliGRAM(s) IV Push every 4 hours PRN Pruritus  HYDROmorphone PCA (1 mG/mL) Rescue Clinician Bolus 0.5 milliGRAM(s) IV Push every 15 minutes PRN for Pain Scale GREATER THAN 6  nalbuphine Injectable 2.5 milliGRAM(s) IV Push every 6 hours PRN Pruritus  naloxone Injectable 0.1 milliGRAM(s) IV Push every 3 minutes PRN For ANY of the following changes in patient status:  A. RR LESS THAN 10 breaths per minute, B. Oxygen saturation LESS THAN 90%, C. Sedation score of 6  naloxone Injectable 0.1 milliGRAM(s) IV Push every 3 minutes PRN For ANY of the following changes in patient status:  A. RR LESS THAN 10 breaths per minute, B. Oxygen saturation LESS THAN 90%, C. Sedation score of 6  ondansetron Injectable 4 milliGRAM(s) IV Push every 6 hours PRN Nausea  ondansetron Injectable 4 milliGRAM(s) IV Push every 6 hours PRN Nausea      OBJECTIVE: Patient sitting up in bed.    Sedation Score:	[ X] Alert	[ ] Drowsy 	[ ] Arousable	[ ] Asleep	[ ] Unresponsive    Side Effects:	[X ] None	[ ] Nausea	[ ] Vomiting	[ ] Pruritus  		[ ] Other:    Vital Signs Last 24 Hrs  T(C): 36.6 (17 Mar 2022 10:00), Max: 36.9 (16 Mar 2022 22:10)  T(F): 97.9 (17 Mar 2022 10:00), Max: 98.5 (16 Mar 2022 22:10)  HR: 75 (17 Mar 2022 10:00) (59 - 82)  BP: 115/67 (17 Mar 2022 10:00) (99/66 - 128/68)  BP(mean): 81 (16 Mar 2022 21:00) (70 - 101)  RR: 18 (17 Mar 2022 10:00) (10 - 18)  SpO2: 99% (17 Mar 2022 10:00) (94% - 100%)    ASSESSMENT/ PLAN    Therapy to  be:	[ X] Continue   [ ] Discontinued   [ ] Change to prn Analgesics    Documentation and Verification of current medications:   [X] Done	[ ] Not done, not elligible    Comments: Continue IV PCA. Recommend non-opioid adjuvant analgesics to be used when possible and when allowed by primary surgical team.    Progress Note written now but Patient was seen earlier.

## 2022-03-17 NOTE — PROGRESS NOTE ADULT - SUBJECTIVE AND OBJECTIVE BOX
Day __2_ of Anesthesia Pain Management Service    SUBJECTIVE:    Therapy:	  [ ]x IV PCA	   [ ] Epidural           [ ] s/p Spinal Opoid              [ ] Postpartum infusion	  [ ] Patient controlled regional anesthesia (PCRA)    [ ] prn Analgesics    OBJECTIVE:   [x ] No new signs     [ ] Other:    Side Effects:  [ x] None			[ ] Other:    Assessment of Catheter Site:		[ ] Intact		[ ] Other:    ASSESSMENT/PLAN  [ x] Continue current therapy    [ ] Therapy changed to:    [ ] IV PCA       [ ] Epidural     [ ] prn Analgesics     Comments:

## 2022-03-18 LAB
ALBUMIN SERPL ELPH-MCNC: 3.7 G/DL — SIGNIFICANT CHANGE UP (ref 3.3–5)
ALP SERPL-CCNC: 75 U/L — SIGNIFICANT CHANGE UP (ref 40–120)
ALT FLD-CCNC: 251 U/L — HIGH (ref 4–33)
ANION GAP SERPL CALC-SCNC: 12 MMOL/L — SIGNIFICANT CHANGE UP (ref 7–14)
ANION GAP SERPL CALC-SCNC: 7 MMOL/L — SIGNIFICANT CHANGE UP (ref 7–14)
ANION GAP SERPL CALC-SCNC: 8 MMOL/L — SIGNIFICANT CHANGE UP (ref 7–14)
APTT BLD: 27 SEC — SIGNIFICANT CHANGE UP (ref 27–36.3)
AST SERPL-CCNC: 197 U/L — HIGH (ref 4–32)
BILIRUB SERPL-MCNC: 2.2 MG/DL — HIGH (ref 0.2–1.2)
BUN SERPL-MCNC: 4 MG/DL — LOW (ref 7–23)
BUN SERPL-MCNC: 5 MG/DL — LOW (ref 7–23)
BUN SERPL-MCNC: 5 MG/DL — LOW (ref 7–23)
CALCIUM SERPL-MCNC: 8.4 MG/DL — SIGNIFICANT CHANGE UP (ref 8.4–10.5)
CALCIUM SERPL-MCNC: 8.4 MG/DL — SIGNIFICANT CHANGE UP (ref 8.4–10.5)
CALCIUM SERPL-MCNC: 8.7 MG/DL — SIGNIFICANT CHANGE UP (ref 8.4–10.5)
CHLORIDE SERPL-SCNC: 96 MMOL/L — LOW (ref 98–107)
CHLORIDE SERPL-SCNC: 96 MMOL/L — LOW (ref 98–107)
CHLORIDE SERPL-SCNC: 97 MMOL/L — LOW (ref 98–107)
CO2 SERPL-SCNC: 26 MMOL/L — SIGNIFICANT CHANGE UP (ref 22–31)
CO2 SERPL-SCNC: 31 MMOL/L — SIGNIFICANT CHANGE UP (ref 22–31)
CO2 SERPL-SCNC: 31 MMOL/L — SIGNIFICANT CHANGE UP (ref 22–31)
CREAT SERPL-MCNC: 0.39 MG/DL — LOW (ref 0.5–1.3)
CREAT SERPL-MCNC: 0.46 MG/DL — LOW (ref 0.5–1.3)
CREAT SERPL-MCNC: 0.46 MG/DL — LOW (ref 0.5–1.3)
EGFR: 109 ML/MIN/1.73M2 — SIGNIFICANT CHANGE UP
EGFR: 109 ML/MIN/1.73M2 — SIGNIFICANT CHANGE UP
EGFR: 113 ML/MIN/1.73M2 — SIGNIFICANT CHANGE UP
GLUCOSE SERPL-MCNC: 104 MG/DL — HIGH (ref 70–99)
GLUCOSE SERPL-MCNC: 132 MG/DL — HIGH (ref 70–99)
GLUCOSE SERPL-MCNC: 137 MG/DL — HIGH (ref 70–99)
HCT VFR BLD CALC: 39.1 % — SIGNIFICANT CHANGE UP (ref 34.5–45)
HGB BLD-MCNC: 13.3 G/DL — SIGNIFICANT CHANGE UP (ref 11.5–15.5)
INR BLD: 1.29 RATIO — HIGH (ref 0.88–1.16)
MAGNESIUM SERPL-MCNC: 1.9 MG/DL — SIGNIFICANT CHANGE UP (ref 1.6–2.6)
MCHC RBC-ENTMCNC: 32.1 PG — SIGNIFICANT CHANGE UP (ref 27–34)
MCHC RBC-ENTMCNC: 34 GM/DL — SIGNIFICANT CHANGE UP (ref 32–36)
MCV RBC AUTO: 94.4 FL — SIGNIFICANT CHANGE UP (ref 80–100)
NRBC # BLD: 0 /100 WBCS — SIGNIFICANT CHANGE UP
NRBC # FLD: 0 K/UL — SIGNIFICANT CHANGE UP
PHOSPHATE SERPL-MCNC: 1.1 MG/DL — LOW (ref 2.5–4.5)
PHOSPHATE SERPL-MCNC: 1.9 MG/DL — LOW (ref 2.5–4.5)
PHOSPHATE SERPL-MCNC: 1.9 MG/DL — LOW (ref 2.5–4.5)
PLATELET # BLD AUTO: 258 K/UL — SIGNIFICANT CHANGE UP (ref 150–400)
POTASSIUM SERPL-MCNC: 3.5 MMOL/L — SIGNIFICANT CHANGE UP (ref 3.5–5.3)
POTASSIUM SERPL-MCNC: 3.6 MMOL/L — SIGNIFICANT CHANGE UP (ref 3.5–5.3)
POTASSIUM SERPL-MCNC: 4.4 MMOL/L — SIGNIFICANT CHANGE UP (ref 3.5–5.3)
POTASSIUM SERPL-SCNC: 3.5 MMOL/L — SIGNIFICANT CHANGE UP (ref 3.5–5.3)
POTASSIUM SERPL-SCNC: 3.6 MMOL/L — SIGNIFICANT CHANGE UP (ref 3.5–5.3)
POTASSIUM SERPL-SCNC: 4.4 MMOL/L — SIGNIFICANT CHANGE UP (ref 3.5–5.3)
PROT SERPL-MCNC: 5.8 G/DL — LOW (ref 6–8.3)
PROTHROM AB SERPL-ACNC: 15 SEC — HIGH (ref 10.5–13.4)
RBC # BLD: 4.14 M/UL — SIGNIFICANT CHANGE UP (ref 3.8–5.2)
RBC # FLD: 12.7 % — SIGNIFICANT CHANGE UP (ref 10.3–14.5)
SODIUM SERPL-SCNC: 134 MMOL/L — LOW (ref 135–145)
SODIUM SERPL-SCNC: 135 MMOL/L — SIGNIFICANT CHANGE UP (ref 135–145)
SODIUM SERPL-SCNC: 135 MMOL/L — SIGNIFICANT CHANGE UP (ref 135–145)
WBC # BLD: 8.42 K/UL — SIGNIFICANT CHANGE UP (ref 3.8–10.5)
WBC # FLD AUTO: 8.42 K/UL — SIGNIFICANT CHANGE UP (ref 3.8–10.5)

## 2022-03-18 RX ORDER — SODIUM,POTASSIUM PHOSPHATES 278-250MG
2 POWDER IN PACKET (EA) ORAL
Refills: 0 | Status: DISCONTINUED | OUTPATIENT
Start: 2022-03-18 | End: 2022-03-20

## 2022-03-18 RX ORDER — HYDROMORPHONE HYDROCHLORIDE 2 MG/ML
30 INJECTION INTRAMUSCULAR; INTRAVENOUS; SUBCUTANEOUS
Refills: 0 | Status: DISCONTINUED | OUTPATIENT
Start: 2022-03-18 | End: 2022-03-19

## 2022-03-18 RX ORDER — SODIUM,POTASSIUM PHOSPHATES 278-250MG
2 POWDER IN PACKET (EA) ORAL ONCE
Refills: 0 | Status: DISCONTINUED | OUTPATIENT
Start: 2022-03-18 | End: 2022-03-18

## 2022-03-18 RX ORDER — POTASSIUM PHOSPHATE, MONOBASIC POTASSIUM PHOSPHATE, DIBASIC 236; 224 MG/ML; MG/ML
10 INJECTION, SOLUTION INTRAVENOUS ONCE
Refills: 0 | Status: DISCONTINUED | OUTPATIENT
Start: 2022-03-18 | End: 2022-03-18

## 2022-03-18 RX ORDER — CLONAZEPAM 1 MG
0.5 TABLET ORAL EVERY 8 HOURS
Refills: 0 | Status: DISCONTINUED | OUTPATIENT
Start: 2022-03-18 | End: 2022-03-20

## 2022-03-18 RX ORDER — SODIUM,POTASSIUM PHOSPHATES 278-250MG
1 POWDER IN PACKET (EA) ORAL
Refills: 0 | Status: DISCONTINUED | OUTPATIENT
Start: 2022-03-18 | End: 2022-03-18

## 2022-03-18 RX ORDER — MAGNESIUM SULFATE 500 MG/ML
2 VIAL (ML) INJECTION ONCE
Refills: 0 | Status: COMPLETED | OUTPATIENT
Start: 2022-03-18 | End: 2022-03-18

## 2022-03-18 RX ORDER — SODIUM CHLORIDE 9 MG/ML
1000 INJECTION INTRAMUSCULAR; INTRAVENOUS; SUBCUTANEOUS
Refills: 0 | Status: DISCONTINUED | OUTPATIENT
Start: 2022-03-18 | End: 2022-03-19

## 2022-03-18 RX ORDER — KETOROLAC TROMETHAMINE 30 MG/ML
15 SYRINGE (ML) INJECTION EVERY 6 HOURS
Refills: 0 | Status: DISCONTINUED | OUTPATIENT
Start: 2022-03-18 | End: 2022-03-20

## 2022-03-18 RX ORDER — ACETAMINOPHEN 500 MG
500 TABLET ORAL EVERY 6 HOURS
Refills: 0 | Status: DISCONTINUED | OUTPATIENT
Start: 2022-03-18 | End: 2022-03-20

## 2022-03-18 RX ORDER — POTASSIUM PHOSPHATE, MONOBASIC POTASSIUM PHOSPHATE, DIBASIC 236; 224 MG/ML; MG/ML
30 INJECTION, SOLUTION INTRAVENOUS ONCE
Refills: 0 | Status: DISCONTINUED | OUTPATIENT
Start: 2022-03-18 | End: 2022-03-18

## 2022-03-18 RX ORDER — POTASSIUM PHOSPHATE, MONOBASIC POTASSIUM PHOSPHATE, DIBASIC 236; 224 MG/ML; MG/ML
30 INJECTION, SOLUTION INTRAVENOUS ONCE
Refills: 0 | Status: COMPLETED | OUTPATIENT
Start: 2022-03-18 | End: 2022-03-18

## 2022-03-18 RX ORDER — SODIUM,POTASSIUM PHOSPHATES 278-250MG
2 POWDER IN PACKET (EA) ORAL
Refills: 0 | Status: DISCONTINUED | OUTPATIENT
Start: 2022-03-18 | End: 2022-03-18

## 2022-03-18 RX ADMIN — HYDROMORPHONE HYDROCHLORIDE 30 MILLILITER(S): 2 INJECTION INTRAMUSCULAR; INTRAVENOUS; SUBCUTANEOUS at 07:19

## 2022-03-18 RX ADMIN — Medication 15 MILLIGRAM(S): at 18:19

## 2022-03-18 RX ADMIN — ENOXAPARIN SODIUM 40 MILLIGRAM(S): 100 INJECTION SUBCUTANEOUS at 11:03

## 2022-03-18 RX ADMIN — Medication 2 TABLET(S): at 21:15

## 2022-03-18 RX ADMIN — Medication 500 MILLIGRAM(S): at 18:28

## 2022-03-18 RX ADMIN — Medication 15 MILLIGRAM(S): at 18:28

## 2022-03-18 RX ADMIN — Medication 10 MILLIGRAM(S): at 11:03

## 2022-03-18 RX ADMIN — HYDROMORPHONE HYDROCHLORIDE 30 MILLILITER(S): 2 INJECTION INTRAMUSCULAR; INTRAVENOUS; SUBCUTANEOUS at 19:45

## 2022-03-18 RX ADMIN — Medication 85 MILLIMOLE(S): at 16:27

## 2022-03-18 RX ADMIN — Medication 500 MILLIGRAM(S): at 18:19

## 2022-03-18 RX ADMIN — SODIUM CHLORIDE 42 MILLILITER(S): 9 INJECTION INTRAMUSCULAR; INTRAVENOUS; SUBCUTANEOUS at 21:15

## 2022-03-18 RX ADMIN — Medication 25 GRAM(S): at 23:32

## 2022-03-18 RX ADMIN — Medication 2 TABLET(S): at 18:19

## 2022-03-18 NOTE — PATIENT PROFILE ADULT - FUNCTIONAL ASSESSMENT - BASIC MOBILITY 1.
[facility], [place]    Brief Operative Note    Pre-operative diagnosis: Esophageal rupture [K22.3]  Post-operative diagnosis Endoscopic suture  Procedure: Procedure(s):  Upper Endoscopy with Suture Placement  Surgeon: Surgeon(s) and Role:     * Zurdo Ramirez MD - Primary     * Harinder Nielsen MD - Fellow - Assisting  Anesthesia: General   Estimated blood loss: None  Drains: None    Impression:  - Uncomplicated fixation of the in situ well positioned                        fully expanded esophageal stent using two endoscopic                        sutures   Recommendation:                             - General anesthesia recovery with return to the floor                        when appropriate                        - Diet and stent follow up recommendations as per Dr Espana                        - The findings and recommendations were discussed with                        the patient and their family       Harinder Nielsen MD  Interventional Endoscopy Fellow      3 = A little assistance

## 2022-03-18 NOTE — PATIENT PROFILE ADULT - FALL HARM RISK - UNIVERSAL INTERVENTIONS
Bed in lowest position, wheels locked, appropriate side rails in place/Call bell, personal items and telephone in reach/Instruct patient to call for assistance before getting out of bed or chair/Non-slip footwear when patient is out of bed/[place] to call system/Physically safe environment - no spills, clutter or unnecessary equipment/Purposeful Proactive Rounding/Room/bathroom lighting operational, light cord in reach

## 2022-03-18 NOTE — PROGRESS NOTE ADULT - SUBJECTIVE AND OBJECTIVE BOX
SURGERY DAILY PROGRESS NOTE:     3/16 right hepatectomy, cholecystectomy for liver mass.  No acute events overnight. Patient seen and examined bedside on AM rounds.     OBJECTIVE:  Vital Signs Last 24 Hrs  T(C): 36.8 (18 Mar 2022 04:59), Max: 37.7 (17 Mar 2022 20:13)  T(F): 98.3 (18 Mar 2022 04:59), Max: 99.9 (17 Mar 2022 20:13)  HR: 81 (18 Mar 2022 04:59) (75 - 81)  BP: 142/73 (18 Mar 2022 04:59) (115/67 - 148/69)  BP(mean): --  RR: 18 (18 Mar 2022 04:59) (17 - 18)  SpO2: 99% (18 Mar 2022 04:59) (98% - 100%)    PHYSICAL EXAM:  Constitutional: NAD  Respiratory: non-labored breathing, patent airway  Gastrointestinal: R sided hockey-stick incision with tenderness, nondistended  : brown in place  Extremities: warm  Neurological: intact               13.6   8.23  )-----------( 289      ( 17 Mar 2022 18:10 )             41.5     03-17    135  |  100  |  12  ----------------------------<  107<H>  4.5   |  26  |  0.60    Ca    8.5      17 Mar 2022 18:10  Phos  1.8     03-17  Mg     2.10     03-17    TPro  5.8<L>  /  Alb  3.8  /  TBili  2.5<H>  /  DBili  x   /  AST  298<H>  /  ALT  288<H>  /  AlkPhos  75  03-17   PT/INR - ( 17 Mar 2022 18:10 )   PT: 14.0 sec;   INR: 1.20 ratio         PTT - ( 17 Mar 2022 18:10 )  PTT:31.3 sec  I&O's Detail    16 Mar 2022 07:01  -  17 Mar 2022 07:00  --------------------------------------------------------  IN:    Oral Fluid: 50 mL    sodium chloride 0.9%: 2000 mL  Total IN: 2050 mL    OUT:    Ureteral Catheter (mL): 1168 mL  Total OUT: 1168 mL    Total NET: 882 mL      17 Mar 2022 07:01  -  18 Mar 2022 06:07  --------------------------------------------------------  IN:    Oral Fluid: 750 mL    sodium chloride 0.9%: 924 mL  Total IN: 1674 mL    OUT:    Ureteral Catheter (mL): 2050 mL  Total OUT: 2050 mL    Total NET: -376 mL       SURGERY DAILY PROGRESS NOTE:     3/16 right hepatectomy, cholecystectomy for liver mass.  No acute events overnight. Patient seen and examined bedside on AM rounds.     OBJECTIVE:  Vital Signs Last 24 Hrs  T(C): 36.8 (18 Mar 2022 10:30), Max: 37.7 (17 Mar 2022 20:13)  T(F): 98.2 (18 Mar 2022 10:30), Max: 99.9 (17 Mar 2022 20:13)  HR: 85 (18 Mar 2022 10:30) (76 - 85)  BP: 136/73 (18 Mar 2022 10:30) (130/71 - 148/69)  BP(mean): --  RR: 18 (18 Mar 2022 10:30) (17 - 18)  SpO2: 93% (18 Mar 2022 10:30) (93% - 100%)    PHYSICAL EXAM:  Constitutional: NAD  Respiratory: non-labored breathing, patent airway  Gastrointestinal: R sided hockey-stick incision with tenderness, nondistended  : brown in place  Extremities: warm  Neurological: intact                              13.3   8.42  )-----------( 258      ( 18 Mar 2022 06:28 )             39.1     03-17    135  |  100  |  12  ----------------------------<  107<H>  4.5   |  26  |  0.60    Ca    8.5      17 Mar 2022 18:10  Phos  1.9     03-18  Mg     2.10     03-17    TPro  5.8<L>  /  Alb  3.8  /  TBili  2.5<H>  /  DBili  x   /  AST  298<H>  /  ALT  288<H>  /  AlkPhos  75  03-17       PTT - ( 17 Mar 2022 18:10 )  PTT:31.3 sec    I&O's Detail    17 Mar 2022 07:01  -  18 Mar 2022 07:00  --------------------------------------------------------  IN:    Oral Fluid: 750 mL    sodium chloride 0.9%: 924 mL  Total IN: 1674 mL    OUT:    Ureteral Catheter (mL): 2050 mL  Total OUT: 2050 mL    Total NET: -376 mL      18 Mar 2022 07:01  -  18 Mar 2022 12:53  --------------------------------------------------------  IN:    sodium chloride 0.9%: 210 mL  Total IN: 210 mL    OUT:    Ureteral Catheter (mL): 1100 mL    Voided (mL): 200 mL  Total OUT: 1300 mL    Total NET: -1090 mL

## 2022-03-18 NOTE — PROGRESS NOTE ADULT - SUBJECTIVE AND OBJECTIVE BOX
Day _3__ of Anesthesia Pain Management Service    SUBJECTIVE:    Therapy:	  [x ] IV PCA	   [ ] Epidural           [ ] s/p Spinal Opoid              [ ] Postpartum infusion	  [ ] Patient controlled regional anesthesia (PCRA)    [ ] prn Analgesics    OBJECTIVE:   [ x] No new signs     [ ] Other:    Side Effects:  [ x] None			[ ] Other:    Assessment of Catheter Site:		[ ] Intact		[ ] Other:    ASSESSMENT/PLAN  [ x] Continue current therapy    [ ] Therapy changed to:    [ ] IV PCA       [ ] Epidural     [ ] prn Analgesics     Comments:

## 2022-03-18 NOTE — PROGRESS NOTE ADULT - ASSESSMENT
62F with biliary cystadenoma. Now, s/p 3/16 right hepatectomy, cholecystectomy.    PLAN  - Liver pathway - replete phos  - Pain: PCA, tylenol  - DVT PPx: LNX  - Diet: CLD  - Trend LFTs    D Team Surgery  [id number]

## 2022-03-18 NOTE — PROGRESS NOTE ADULT - SUBJECTIVE AND OBJECTIVE BOX
Anesthesia Pain Management Service    SUBJECTIVE: Patient has some discomfort lying down and has increased pain with activity. Patient states that sometimes when she presses the button it makes her feel drowsy.  The patient also takes Clonazepam 0.5mg tablets, 2 of them, 3 times a day, and has been taking her own here at the hospital.     Pain Scale Score	At rest: _patient has some discomfort__ 	With Activity: ___ 	[X ] Refer to charted pain scores    THERAPY:    [ ] IV PCA Morphine		[ ] 5 mg/mL	[ ] 1 mg/mL  [X ] IV PCA Hydromorphone	[ ] 5 mg/mL	[X ] 1 mg/mL  [ ] IV PCA Fentanyl		[ ] 50 micrograms/mL    Demand dose __0.2_ lockout __6_ (minutes) Continuous Rate _0__ Total: _2.1__  mg used (in past 24 hours)      MEDICATIONS  (STANDING):  enoxaparin Injectable 40 milliGRAM(s) SubCutaneous every 24 hours  HYDROmorphone PCA (1 mG/mL) 30 milliLiter(s) PCA Continuous PCA Continuous  morphine PF Spinal 0.15 milliGRAM(s) IntraThecal. once  sodium chloride 0.9%. 1000 milliLiter(s) (42 mL/Hr) IV Continuous <Continuous>    MEDICATIONS  (PRN):  diphenhydrAMINE Injectable 12.5 milliGRAM(s) IV Push every 4 hours PRN Pruritus  HYDROmorphone PCA (1 mG/mL) Rescue Clinician Bolus 0.25 milliGRAM(s) IV Push every 15 minutes PRN for Pain Scale GREATER THAN 6  nalbuphine Injectable 2.5 milliGRAM(s) IV Push every 6 hours PRN Pruritus  naloxone Injectable 0.1 milliGRAM(s) IV Push every 3 minutes PRN For ANY of the following changes in patient status:  A. RR LESS THAN 10 breaths per minute, B. Oxygen saturation LESS THAN 90%, C. Sedation score of 6  naloxone Injectable 0.1 milliGRAM(s) IV Push every 3 minutes PRN For ANY of the following changes in patient status:  A. RR LESS THAN 10 breaths per minute, B. Oxygen saturation LESS THAN 90%, C. Sedation score of 6  ondansetron Injectable 4 milliGRAM(s) IV Push every 6 hours PRN Nausea  ondansetron Injectable 4 milliGRAM(s) IV Push every 6 hours PRN Nausea      OBJECTIVE:  Patient is lying in bed.     Sedation Score:	[ X] Alert	 [ ] Drowsy 	[ ] Arousable	[ ] Asleep	[ ] Unresponsive    Side Effects:	[X ] None	[ ] Nausea	[ ] Vomiting	[ ] Pruritus  		[ ] Other:    Vital Signs Last 24 Hrs  T(C): 36.8 (18 Mar 2022 10:30), Max: 37.7 (17 Mar 2022 20:13)  T(F): 98.2 (18 Mar 2022 10:30), Max: 99.9 (17 Mar 2022 20:13)  HR: 85 (18 Mar 2022 10:30) (76 - 85)  BP: 136/73 (18 Mar 2022 10:30) (130/71 - 148/69)  BP(mean): --  RR: 18 (18 Mar 2022 10:30) (17 - 18)  SpO2: 93% (18 Mar 2022 10:30) (93% - 100%)    ASSESSMENT/ PLAN    Therapy to  be:	[ X] Continue   [ ] Discontinued   [ ] Change to prn Analgesics    Documentation and Verification of current medications:   [X] Done	[ ] Not done, not elligible    Comments: Will continue with IV Dilaudid PCA. Increased IV PCA demand to 0.25 mg.  As per RN, patient was taking her own Clonazepam, so it was taken and placed in a secure location. Team made aware that patient is taking Clonazepam at home, and will reorder at half dose.  Recommend non-opioid adjuvant analgesics to be used when possible and when allowed by primary surgical team.    Progress Note written now but Patient was seen earlier.

## 2022-03-18 NOTE — PROGRESS NOTE ADULT - ASSESSMENT
s/p liver lobe resection  s/p cholecystectomy  CV stable  fu with surgery     DVT proph  on lovenox    Mild HTN  will monitor for now

## 2022-03-18 NOTE — PROGRESS NOTE ADULT - SUBJECTIVE AND OBJECTIVE BOX
DATE OF SERVICE: 03-18-22 @ 08:43    Subjective: Patient seen and examined. No new events except as noted.     SUBJECTIVE/ROS:  feels ok       MEDICATIONS:  MEDICATIONS  (STANDING):  enoxaparin Injectable 40 milliGRAM(s) SubCutaneous every 24 hours  HYDROmorphone PCA (1 mG/mL) 30 milliLiter(s) PCA Continuous PCA Continuous  morphine PF Spinal 0.15 milliGRAM(s) IntraThecal. once  phytonadione SubCutaneous Injection - Peds 10 milliGRAM(s) SubCutaneous daily  sodium chloride 0.9%. 1000 milliLiter(s) (42 mL/Hr) IV Continuous <Continuous>      PHYSICAL EXAM:  T(C): 36.8 (03-18-22 @ 04:59), Max: 37.7 (03-17-22 @ 20:13)  HR: 81 (03-18-22 @ 04:59) (75 - 81)  BP: 142/73 (03-18-22 @ 04:59) (115/67 - 148/69)  RR: 18 (03-18-22 @ 04:59) (17 - 18)  SpO2: 99% (03-18-22 @ 04:59) (98% - 100%)  Wt(kg): --  I&O's Summary    17 Mar 2022 07:01  -  18 Mar 2022 07:00  --------------------------------------------------------  IN: 1674 mL / OUT: 2050 mL / NET: -376 mL            JVP: Normal  Neck: supple  Lung: clear   CV: S1 S2 , Murmur:  Abd: soft  Ext: No edema  neuro: Awake / alert  Psych: flat affect  Skin: normal``    LABS/DATA:    CARDIAC MARKERS:                                13.3   8.42  )-----------( 258      ( 18 Mar 2022 06:28 )             39.1     03-17    135  |  100  |  12  ----------------------------<  107<H>  4.5   |  26  |  0.60    Ca    8.5      17 Mar 2022 18:10  Phos  1.9     03-18  Mg     2.10     03-17    TPro  5.8<L>  /  Alb  3.8  /  TBili  2.5<H>  /  DBili  x   /  AST  298<H>  /  ALT  288<H>  /  AlkPhos  75  03-17    proBNP:   Lipid Profile:   HgA1c:   TSH:     TELE:  EKG:

## 2022-03-19 LAB
ALBUMIN SERPL ELPH-MCNC: 3.4 G/DL — SIGNIFICANT CHANGE UP (ref 3.3–5)
ALP SERPL-CCNC: 85 U/L — SIGNIFICANT CHANGE UP (ref 40–120)
ALT FLD-CCNC: 217 U/L — HIGH (ref 4–33)
ANION GAP SERPL CALC-SCNC: 13 MMOL/L — SIGNIFICANT CHANGE UP (ref 7–14)
APTT BLD: 27.2 SEC — SIGNIFICANT CHANGE UP (ref 27–36.3)
AST SERPL-CCNC: 149 U/L — HIGH (ref 4–32)
BILIRUB SERPL-MCNC: 1.4 MG/DL — HIGH (ref 0.2–1.2)
BUN SERPL-MCNC: 7 MG/DL — SIGNIFICANT CHANGE UP (ref 7–23)
CALCIUM SERPL-MCNC: 8.1 MG/DL — LOW (ref 8.4–10.5)
CHLORIDE SERPL-SCNC: 97 MMOL/L — LOW (ref 98–107)
CO2 SERPL-SCNC: 26 MMOL/L — SIGNIFICANT CHANGE UP (ref 22–31)
CREAT SERPL-MCNC: 0.56 MG/DL — SIGNIFICANT CHANGE UP (ref 0.5–1.3)
EGFR: 104 ML/MIN/1.73M2 — SIGNIFICANT CHANGE UP
GLUCOSE SERPL-MCNC: 92 MG/DL — SIGNIFICANT CHANGE UP (ref 70–99)
HCT VFR BLD CALC: 39.2 % — SIGNIFICANT CHANGE UP (ref 34.5–45)
HGB BLD-MCNC: 13.1 G/DL — SIGNIFICANT CHANGE UP (ref 11.5–15.5)
MAGNESIUM SERPL-MCNC: 2.4 MG/DL — SIGNIFICANT CHANGE UP (ref 1.6–2.6)
MCHC RBC-ENTMCNC: 31.5 PG — SIGNIFICANT CHANGE UP (ref 27–34)
MCHC RBC-ENTMCNC: 33.4 GM/DL — SIGNIFICANT CHANGE UP (ref 32–36)
MCV RBC AUTO: 94.2 FL — SIGNIFICANT CHANGE UP (ref 80–100)
NRBC # BLD: 0 /100 WBCS — SIGNIFICANT CHANGE UP
NRBC # FLD: 0 K/UL — SIGNIFICANT CHANGE UP
PHOSPHATE SERPL-MCNC: 3.5 MG/DL — SIGNIFICANT CHANGE UP (ref 2.5–4.5)
PLATELET # BLD AUTO: 245 K/UL — SIGNIFICANT CHANGE UP (ref 150–400)
POTASSIUM SERPL-MCNC: 3.8 MMOL/L — SIGNIFICANT CHANGE UP (ref 3.5–5.3)
POTASSIUM SERPL-SCNC: 3.8 MMOL/L — SIGNIFICANT CHANGE UP (ref 3.5–5.3)
PROT SERPL-MCNC: 5.8 G/DL — LOW (ref 6–8.3)
RBC # BLD: 4.16 M/UL — SIGNIFICANT CHANGE UP (ref 3.8–5.2)
RBC # FLD: 12.9 % — SIGNIFICANT CHANGE UP (ref 10.3–14.5)
SODIUM SERPL-SCNC: 136 MMOL/L — SIGNIFICANT CHANGE UP (ref 135–145)
WBC # BLD: 7.47 K/UL — SIGNIFICANT CHANGE UP (ref 3.8–10.5)
WBC # FLD AUTO: 7.47 K/UL — SIGNIFICANT CHANGE UP (ref 3.8–10.5)

## 2022-03-19 RX ORDER — OXYCODONE HYDROCHLORIDE 5 MG/1
10 TABLET ORAL EVERY 6 HOURS
Refills: 0 | Status: DISCONTINUED | OUTPATIENT
Start: 2022-03-19 | End: 2022-03-20

## 2022-03-19 RX ORDER — FUROSEMIDE 40 MG
20 TABLET ORAL ONCE
Refills: 0 | Status: DISCONTINUED | OUTPATIENT
Start: 2022-03-19 | End: 2022-03-19

## 2022-03-19 RX ORDER — FUROSEMIDE 40 MG
20 TABLET ORAL ONCE
Refills: 0 | Status: COMPLETED | OUTPATIENT
Start: 2022-03-19 | End: 2022-03-19

## 2022-03-19 RX ORDER — OXYCODONE HYDROCHLORIDE 5 MG/1
5 TABLET ORAL EVERY 6 HOURS
Refills: 0 | Status: DISCONTINUED | OUTPATIENT
Start: 2022-03-19 | End: 2022-03-20

## 2022-03-19 RX ADMIN — HYDROMORPHONE HYDROCHLORIDE 30 MILLILITER(S): 2 INJECTION INTRAMUSCULAR; INTRAVENOUS; SUBCUTANEOUS at 07:21

## 2022-03-19 RX ADMIN — Medication 15 MILLIGRAM(S): at 11:50

## 2022-03-19 RX ADMIN — OXYCODONE HYDROCHLORIDE 5 MILLIGRAM(S): 5 TABLET ORAL at 10:35

## 2022-03-19 RX ADMIN — Medication 500 MILLIGRAM(S): at 11:50

## 2022-03-19 RX ADMIN — Medication 15 MILLIGRAM(S): at 17:53

## 2022-03-19 RX ADMIN — Medication 2 TABLET(S): at 23:01

## 2022-03-19 RX ADMIN — ENOXAPARIN SODIUM 40 MILLIGRAM(S): 100 INJECTION SUBCUTANEOUS at 11:50

## 2022-03-19 RX ADMIN — OXYCODONE HYDROCHLORIDE 10 MILLIGRAM(S): 5 TABLET ORAL at 21:46

## 2022-03-19 RX ADMIN — Medication 500 MILLIGRAM(S): at 18:30

## 2022-03-19 RX ADMIN — Medication 2 TABLET(S): at 08:00

## 2022-03-19 RX ADMIN — Medication 500 MILLIGRAM(S): at 17:53

## 2022-03-19 RX ADMIN — Medication 15 MILLIGRAM(S): at 23:00

## 2022-03-19 RX ADMIN — Medication 15 MILLIGRAM(S): at 18:30

## 2022-03-19 RX ADMIN — Medication 2 TABLET(S): at 11:51

## 2022-03-19 RX ADMIN — OXYCODONE HYDROCHLORIDE 10 MILLIGRAM(S): 5 TABLET ORAL at 14:59

## 2022-03-19 RX ADMIN — Medication 20 MILLIGRAM(S): at 15:54

## 2022-03-19 RX ADMIN — OXYCODONE HYDROCHLORIDE 10 MILLIGRAM(S): 5 TABLET ORAL at 20:46

## 2022-03-19 RX ADMIN — OXYCODONE HYDROCHLORIDE 5 MILLIGRAM(S): 5 TABLET ORAL at 11:00

## 2022-03-19 RX ADMIN — Medication 2 TABLET(S): at 17:53

## 2022-03-19 RX ADMIN — POTASSIUM PHOSPHATE, MONOBASIC POTASSIUM PHOSPHATE, DIBASIC 83.33 MILLIMOLE(S): 236; 224 INJECTION, SOLUTION INTRAVENOUS at 00:43

## 2022-03-19 RX ADMIN — Medication 500 MILLIGRAM(S): at 23:00

## 2022-03-19 NOTE — PROGRESS NOTE ADULT - ASSESSMENT
s/p liver lobe resection  s/p cholecystectomy  CV stable  fu with surgery     DVT proph  on lovenox    Mild HTN  will monitor for now   fu as outpt to see if needs treatment

## 2022-03-19 NOTE — PROGRESS NOTE ADULT - ASSESSMENT
62F with biliary cystadenoma. Now, s/p 3/16 right hepatectomy, cholecystectomy.    PLAN  - Liver pathway - replete phos  - Pain: PCA, tylenol  - DVT PPx: LNX  - Diet: Regular  - Trend LFTs    D Team Surgery  [id number] 62F with biliary cystadenoma. Now, s/p 3/16 right hepatectomy, cholecystectomy.    PLAN  - Liver pathway - replete phos  - Pain: PCA off, oxy PRN  - DVT PPx: LNX  - Diet: Regular  - IVL  - 20 mg lasix  - Trend LFTs    D Team Surgery  [id number]

## 2022-03-19 NOTE — PROGRESS NOTE ADULT - SUBJECTIVE AND OBJECTIVE BOX
DATE OF SERVICE: 03-19-22 @ 12:42    Subjective: Patient seen and examined. No new events except as noted.     SUBJECTIVE/ROS:  feels ok     MEDICATIONS:  MEDICATIONS  (STANDING):  acetaminophen     Tablet .. 500 milliGRAM(s) Oral every 6 hours  enoxaparin Injectable 40 milliGRAM(s) SubCutaneous every 24 hours  furosemide    Tablet 20 milliGRAM(s) Oral once  ketorolac   Injectable 15 milliGRAM(s) IV Push every 6 hours  potassium phosphate / sodium phosphate Tablet (K-PHOS No. 2) 2 Tablet(s) Oral four times a day with meals      PHYSICAL EXAM:  T(C): 37.1 (03-19-22 @ 10:00), Max: 37.1 (03-19-22 @ 10:00)  HR: 89 (03-19-22 @ 10:00) (73 - 93)  BP: 140/79 (03-19-22 @ 10:00) (100/68 - 140/79)  RR: 18 (03-19-22 @ 10:00) (17 - 18)  SpO2: 92% (03-19-22 @ 10:00) (92% - 96%)  Wt(kg): --  I&O's Summary    18 Mar 2022 07:01  -  19 Mar 2022 07:00  --------------------------------------------------------  IN: 1161 mL / OUT: 1550 mL / NET: -389 mL            JVP: Normal  Neck: supple  Lung: clear   CV: S1 S2 , Murmur:  Abd: soft  Ext: No edema  neuro: Awake / alert  Psych: flat affect  Skin: normal``    LABS/DATA:    CARDIAC MARKERS:                                13.1   7.47  )-----------( 245      ( 19 Mar 2022 06:28 )             39.2     03-19    136  |  97<L>  |  7   ----------------------------<  92  3.8   |  26  |  0.56    Ca    8.1<L>      19 Mar 2022 06:28  Phos  1.9     03-18  Mg     1.90     03-18    TPro  5.8<L>  /  Alb  3.4  /  TBili  1.4<H>  /  DBili  x   /  AST  149<H>  /  ALT  217<H>  /  AlkPhos  85  03-19    proBNP:   Lipid Profile:   HgA1c:   TSH:     TELE:  EKG:

## 2022-03-19 NOTE — PROGRESS NOTE ADULT - SUBJECTIVE AND OBJECTIVE BOX
Anesthesia Pain Management Service    SUBJECTIVE: Pt now off IV PCA without problems reported.  Patient woke up feeling good, went back to sleep and some pain right now.  RN called to give pain medications.   Pain Score: 6/10    THERAPY:    [ ] IV PCA Morphine		[ ] 5 mg/mL	[ ] 1 mg/mL  [X ] IV PCA Hydromorphone	[ ] 5 mg/mL	[X ] 1 mg/mL  [ ] IV PCA Fentanyl		[ ] 50 micrograms/mL    Demand dose __0.2_ lockout __6_ (minutes) Continuous Rate _0__ Total: _3.9_  mg used (in past 24 hours)    Therapy:	  [ X] IV PCA	   [ ] Epidural           [ ] s/p Spinal Opoid              [ ] Postpartum infusion	  [ ] Patient controlled regional anesthesia (PCRA)    [ ] prn Analgesics    Allergies  No Known Allergies    MEDICATIONS  (STANDING):  acetaminophen     Tablet .. 500 milliGRAM(s) Oral every 6 hours  enoxaparin Injectable 40 milliGRAM(s) SubCutaneous every 24 hours  furosemide    Tablet 20 milliGRAM(s) Oral once  ketorolac   Injectable 15 milliGRAM(s) IV Push every 6 hours  potassium phosphate / sodium phosphate Tablet (K-PHOS No. 2) 2 Tablet(s) Oral four times a day with meals    MEDICATIONS  (PRN):  clonazePAM  Tablet 0.5 milliGRAM(s) Oral every 8 hours PRN Anxiety  diphenhydrAMINE Injectable 12.5 milliGRAM(s) IV Push every 4 hours PRN Pruritus  nalbuphine Injectable 2.5 milliGRAM(s) IV Push every 6 hours PRN Pruritus  ondansetron Injectable 4 milliGRAM(s) IV Push every 6 hours PRN Nausea  oxyCODONE    IR 5 milliGRAM(s) Oral every 6 hours PRN Moderate Pain (4 - 6)  oxyCODONE    IR 10 milliGRAM(s) Oral every 6 hours PRN Severe Pain (7 - 10)      OBJECTIVE:   [X] No new signs     [ ] Other:    Side Effects:  [X ] None			[ ] Other:    Assessment of Catheter Site:		[ ] Intact		[ ] Other:    ASSESSMENT/PLAN  [ ] Continue current therapy    [X ] Therapy changed to:    [ ] IV PCA       [ ] Epidural     [ X] prn Analgesics     Comments: IV PCA discontinued by team and they ordered PRN Oral/IV opioids and/or non-opioid adjuvant analgesics to be used at this point.  Pain service to sign off.      Progress Note written now but Patient was seen earlier.

## 2022-03-19 NOTE — PROGRESS NOTE ADULT - SUBJECTIVE AND OBJECTIVE BOX
SURGERY DAILY PROGRESS NOTE:     PROCEDURE: 3/16 right hepatectomy, cholecystectomy for liver mass.     INTERVAL EVENTS: Passed TOV    SUBJECTIVE/ROS: No acute events overnight. Patient seen and examined bedside on AM rounds.     OBJECTIVE:  Vital Signs Last 24 Hrs  T(C): 36.6 (19 Mar 2022 01:29), Max: 36.8 (18 Mar 2022 04:59)  T(F): 97.9 (19 Mar 2022 01:29), Max: 98.3 (18 Mar 2022 04:59)  HR: 73 (19 Mar 2022 01:29) (73 - 85)  BP: 121/74 (19 Mar 2022 01:29) (100/68 - 142/73)  BP(mean): --  RR: 17 (19 Mar 2022 01:29) (17 - 18)  SpO2: 96% (19 Mar 2022 01:29) (92% - 99%)    PHYSICAL EXAM:  Constitutional: NAD  Respiratory: non-labored breathing, patent airway  Gastrointestinal: R sided hockey-stick incision with tenderness, nondistended  Extremities: warm  Neurological: intact                        13.3   8.42  )-----------( 258      ( 18 Mar 2022 06:28 )             39.1     03-18    134<L>  |  96<L>  |  5<L>  ----------------------------<  137<H>  3.5   |  31  |  0.46<L>    Ca    8.4      18 Mar 2022 19:20  Phos  1.9     03-18  Mg     1.90     03-18    TPro  5.8<L>  /  Alb  3.7  /  TBili  2.2<H>  /  DBili  x   /  AST  197<H>  /  ALT  251<H>  /  AlkPhos  75  03-18   PT/INR - ( 18 Mar 2022 06:28 )   PT: 15.0 sec;   INR: 1.29 ratio         PTT - ( 18 Mar 2022 06:28 )  PTT:27.0 sec  I&O's Detail    17 Mar 2022 07:01  -  18 Mar 2022 07:00  --------------------------------------------------------  IN:    Oral Fluid: 750 mL    sodium chloride 0.9%: 924 mL  Total IN: 1674 mL    OUT:    Ureteral Catheter (mL): 2050 mL  Total OUT: 2050 mL    Total NET: -376 mL      18 Mar 2022 07:01  -  19 Mar 2022 04:17  --------------------------------------------------------  IN:    IV PiggyBack: 550 mL    Oral Fluid: 275 mL    sodium chloride 0.9%: 126 mL    sodium chloride 0.9%: 210 mL  Total IN: 1161 mL    OUT:    Ureteral Catheter (mL): 1100 mL    Voided (mL): 450 mL  Total OUT: 1550 mL    Total NET: -389 mL          IMAGING:               SURGERY DAILY PROGRESS NOTE:     PROCEDURE: 3/16 right hepatectomy, cholecystectomy for liver mass.     INTERVAL EVENTS: Passed TOV    SUBJECTIVE/ROS: No acute events overnight. Patient seen and examined bedside on AM rounds. Patient states that pain is controlled. Tolerating diet with no nausea or vomiting. Passing gas with no BMs. Getting OOB.      OBJECTIVE:  Vital Signs Last 24 Hrs  T(C): 36.6 (19 Mar 2022 01:29), Max: 36.8 (18 Mar 2022 04:59)  T(F): 97.9 (19 Mar 2022 01:29), Max: 98.3 (18 Mar 2022 04:59)  HR: 73 (19 Mar 2022 01:29) (73 - 85)  BP: 121/74 (19 Mar 2022 01:29) (100/68 - 142/73)  BP(mean): --  RR: 17 (19 Mar 2022 01:29) (17 - 18)  SpO2: 96% (19 Mar 2022 01:29) (92% - 99%)    PHYSICAL EXAM:  Constitutional: NAD  Respiratory: non-labored breathing, patent airway  Gastrointestinal: R sided hockey-stick incision with tenderness, nondistended  Extremities: warm  Neurological: intact                        13.3   8.42  )-----------( 258      ( 18 Mar 2022 06:28 )             39.1     03-18    134<L>  |  96<L>  |  5<L>  ----------------------------<  137<H>  3.5   |  31  |  0.46<L>    Ca    8.4      18 Mar 2022 19:20  Phos  1.9     03-18  Mg     1.90     03-18    TPro  5.8<L>  /  Alb  3.7  /  TBili  2.2<H>  /  DBili  x   /  AST  197<H>  /  ALT  251<H>  /  AlkPhos  75  03-18   PT/INR - ( 18 Mar 2022 06:28 )   PT: 15.0 sec;   INR: 1.29 ratio         PTT - ( 18 Mar 2022 06:28 )  PTT:27.0 sec  I&O's Detail    17 Mar 2022 07:01  -  18 Mar 2022 07:00  --------------------------------------------------------  IN:    Oral Fluid: 750 mL    sodium chloride 0.9%: 924 mL  Total IN: 1674 mL    OUT:    Ureteral Catheter (mL): 2050 mL  Total OUT: 2050 mL    Total NET: -376 mL      18 Mar 2022 07:01  -  19 Mar 2022 04:17  --------------------------------------------------------  IN:    IV PiggyBack: 550 mL    Oral Fluid: 275 mL    sodium chloride 0.9%: 126 mL    sodium chloride 0.9%: 210 mL  Total IN: 1161 mL    OUT:    Ureteral Catheter (mL): 1100 mL    Voided (mL): 450 mL  Total OUT: 1550 mL    Total NET: -389 mL          IMAGING:

## 2022-03-19 NOTE — PROGRESS NOTE ADULT - SUBJECTIVE AND OBJECTIVE BOX
Anesthesia Pain Management Service    SUBJECTIVE: Pt now off IV PCA without problems reported.  Patient woke up feeling good, went back to sleep and some pain right now.  RN called to give pain medications.   Pain Score: 6/10    THERAPY:    [ ] IV PCA Morphine		[ ] 5 mg/mL	[ ] 1 mg/mL  [X ] IV PCA Hydromorphone	[ ] 5 mg/mL	[X ] 1 mg/mL  [ ] IV PCA Fentanyl		[ ] 50 micrograms/mL    Demand dose __0.2_ lockout __6_ (minutes) Continuous Rate _0__ Total: _3.9_  mg used (in past 24 hours)    Therapy:	  [ X] IV PCA	   [ ] Epidural           [ ] s/p Spinal Opoid              [ ] Postpartum infusion	  [ ] Patient controlled regional anesthesia (PCRA)    [ ] prn Analgesics    Allergies  No Known Allergies    MEDICATIONS  (STANDING):  acetaminophen     Tablet .. 500 milliGRAM(s) Oral every 6 hours  enoxaparin Injectable 40 milliGRAM(s) SubCutaneous every 24 hours  furosemide    Tablet 20 milliGRAM(s) Oral once  ketorolac   Injectable 15 milliGRAM(s) IV Push every 6 hours  potassium phosphate / sodium phosphate Tablet (K-PHOS No. 2) 2 Tablet(s) Oral four times a day with meals    MEDICATIONS  (PRN):  clonazePAM  Tablet 0.5 milliGRAM(s) Oral every 8 hours PRN Anxiety  diphenhydrAMINE Injectable 12.5 milliGRAM(s) IV Push every 4 hours PRN Pruritus  nalbuphine Injectable 2.5 milliGRAM(s) IV Push every 6 hours PRN Pruritus  ondansetron Injectable 4 milliGRAM(s) IV Push every 6 hours PRN Nausea  oxyCODONE    IR 5 milliGRAM(s) Oral every 6 hours PRN Moderate Pain (4 - 6)  oxyCODONE    IR 10 milliGRAM(s) Oral every 6 hours PRN Severe Pain (7 - 10)      OBJECTIVE:   [X] No new signs     [ ] Other:    Side Effects:  [X ] None			[ ] Other:    Assessment of Catheter Site:		[ ] Intact		[ ] Other:    ASSESSMENT/PLAN  [ ] Continue current therapy    [X ] Therapy changed to:    [ ] IV PCA       [ ] Epidural     [ X] prn Analgesics     Comments: IV PCA discontinued by team and they ordered PRN Oral/IV opioids and/or non-opioid adjuvant analgesics to be used at this point.  Pain service to sign off.

## 2022-03-20 ENCOUNTER — TRANSCRIPTION ENCOUNTER (OUTPATIENT)
Age: 62
End: 2022-03-20

## 2022-03-20 VITALS
SYSTOLIC BLOOD PRESSURE: 151 MMHG | TEMPERATURE: 99 F | OXYGEN SATURATION: 94 % | DIASTOLIC BLOOD PRESSURE: 87 MMHG | RESPIRATION RATE: 18 BRPM | HEART RATE: 92 BPM

## 2022-03-20 LAB
ALBUMIN SERPL ELPH-MCNC: 3.2 G/DL — LOW (ref 3.3–5)
ALP SERPL-CCNC: 74 U/L — SIGNIFICANT CHANGE UP (ref 40–120)
ALT FLD-CCNC: 159 U/L — HIGH (ref 4–33)
ANION GAP SERPL CALC-SCNC: 7 MMOL/L — SIGNIFICANT CHANGE UP (ref 7–14)
AST SERPL-CCNC: 83 U/L — HIGH (ref 4–32)
BILIRUB SERPL-MCNC: 0.9 MG/DL — SIGNIFICANT CHANGE UP (ref 0.2–1.2)
BUN SERPL-MCNC: 7 MG/DL — SIGNIFICANT CHANGE UP (ref 7–23)
CALCIUM SERPL-MCNC: 8.4 MG/DL — SIGNIFICANT CHANGE UP (ref 8.4–10.5)
CHLORIDE SERPL-SCNC: 97 MMOL/L — LOW (ref 98–107)
CO2 SERPL-SCNC: 32 MMOL/L — HIGH (ref 22–31)
CREAT SERPL-MCNC: 0.56 MG/DL — SIGNIFICANT CHANGE UP (ref 0.5–1.3)
EGFR: 104 ML/MIN/1.73M2 — SIGNIFICANT CHANGE UP
GLUCOSE SERPL-MCNC: 108 MG/DL — HIGH (ref 70–99)
HCT VFR BLD CALC: 35.7 % — SIGNIFICANT CHANGE UP (ref 34.5–45)
HGB BLD-MCNC: 12 G/DL — SIGNIFICANT CHANGE UP (ref 11.5–15.5)
MAGNESIUM SERPL-MCNC: 2.1 MG/DL — SIGNIFICANT CHANGE UP (ref 1.6–2.6)
MCHC RBC-ENTMCNC: 31.8 PG — SIGNIFICANT CHANGE UP (ref 27–34)
MCHC RBC-ENTMCNC: 33.6 GM/DL — SIGNIFICANT CHANGE UP (ref 32–36)
MCV RBC AUTO: 94.7 FL — SIGNIFICANT CHANGE UP (ref 80–100)
NRBC # BLD: 0 /100 WBCS — SIGNIFICANT CHANGE UP
NRBC # FLD: 0 K/UL — SIGNIFICANT CHANGE UP
PHOSPHATE SERPL-MCNC: 2.9 MG/DL — SIGNIFICANT CHANGE UP (ref 2.5–4.5)
PLATELET # BLD AUTO: 246 K/UL — SIGNIFICANT CHANGE UP (ref 150–400)
POTASSIUM SERPL-MCNC: 3.3 MMOL/L — LOW (ref 3.5–5.3)
POTASSIUM SERPL-SCNC: 3.3 MMOL/L — LOW (ref 3.5–5.3)
PROT SERPL-MCNC: 5.5 G/DL — LOW (ref 6–8.3)
RBC # BLD: 3.77 M/UL — LOW (ref 3.8–5.2)
RBC # FLD: 13.1 % — SIGNIFICANT CHANGE UP (ref 10.3–14.5)
SODIUM SERPL-SCNC: 136 MMOL/L — SIGNIFICANT CHANGE UP (ref 135–145)
WBC # BLD: 5.66 K/UL — SIGNIFICANT CHANGE UP (ref 3.8–10.5)
WBC # FLD AUTO: 5.66 K/UL — SIGNIFICANT CHANGE UP (ref 3.8–10.5)

## 2022-03-20 RX ORDER — SODIUM,POTASSIUM PHOSPHATES 278-250MG
1 POWDER IN PACKET (EA) ORAL
Qty: 6 | Refills: 0
Start: 2022-03-20 | End: 2022-03-22

## 2022-03-20 RX ORDER — OXYCODONE HYDROCHLORIDE 5 MG/1
1 TABLET ORAL
Qty: 12 | Refills: 0
Start: 2022-03-20 | End: 2022-03-22

## 2022-03-20 RX ORDER — ZOLPIDEM TARTRATE 10 MG/1
1 TABLET ORAL
Qty: 0 | Refills: 0 | DISCHARGE

## 2022-03-20 RX ORDER — POTASSIUM CHLORIDE 20 MEQ
10 PACKET (EA) ORAL ONCE
Refills: 0 | Status: DISCONTINUED | OUTPATIENT
Start: 2022-03-20 | End: 2022-03-20

## 2022-03-20 RX ADMIN — OXYCODONE HYDROCHLORIDE 10 MILLIGRAM(S): 5 TABLET ORAL at 02:46

## 2022-03-20 RX ADMIN — OXYCODONE HYDROCHLORIDE 5 MILLIGRAM(S): 5 TABLET ORAL at 16:36

## 2022-03-20 RX ADMIN — Medication 2 TABLET(S): at 13:49

## 2022-03-20 RX ADMIN — OXYCODONE HYDROCHLORIDE 10 MILLIGRAM(S): 5 TABLET ORAL at 03:46

## 2022-03-20 NOTE — DISCHARGE NOTE NURSING/CASE MANAGEMENT/SOCIAL WORK - PATIENT PORTAL LINK FT
You can access the FollowMyHealth Patient Portal offered by [facility] by registering at the following website: [email or web address]. By joining [facility]’s FollowMyHealth portal, you will also be able to view your health information using other applications (apps) compatible with our system.

## 2022-03-20 NOTE — PROGRESS NOTE ADULT - SUBJECTIVE AND OBJECTIVE BOX
DATE OF SERVICE: 03-20-22 @ 07:21    Subjective: Patient seen and examined. No new events except as noted.     SUBJECTIVE/ROS:  feels ok       MEDICATIONS:  MEDICATIONS  (STANDING):  acetaminophen     Tablet .. 500 milliGRAM(s) Oral every 6 hours  enoxaparin Injectable 40 milliGRAM(s) SubCutaneous every 24 hours  ketorolac   Injectable 15 milliGRAM(s) IV Push every 6 hours  potassium phosphate / sodium phosphate Tablet (K-PHOS No. 2) 2 Tablet(s) Oral four times a day with meals      PHYSICAL EXAM:  T(C): 36.6 (03-20-22 @ 05:46), Max: 37.5 (03-19-22 @ 20:51)  HR: 76 (03-20-22 @ 05:46) (64 - 89)  BP: 104/50 (03-20-22 @ 05:46) (101/67 - 140/79)  RR: 17 (03-20-22 @ 05:46) (16 - 18)  SpO2: 96% (03-20-22 @ 05:46) (92% - 96%)  Wt(kg): --  I&O's Summary    19 Mar 2022 07:01  -  20 Mar 2022 07:00  --------------------------------------------------------  IN: 0 mL / OUT: 400 mL / NET: -400 mL            JVP: Normal  Neck: supple  Lung: clear   CV: S1 S2 , Murmur:  Abd: soft  Ext: No edema  neuro: Awake / alert  Psych: flat affect  Skin: normal``    LABS/DATA:    CARDIAC MARKERS:                                12.0   5.66  )-----------( 246      ( 20 Mar 2022 06:41 )             35.7     03-20    136  |  97<L>  |  7   ----------------------------<  108<H>  3.3<L>   |  32<H>  |  0.56    Ca    8.4      20 Mar 2022 06:41  Phos  2.9     03-20  Mg     2.10     03-20    TPro  5.5<L>  /  Alb  3.2<L>  /  TBili  0.9  /  DBili  x   /  AST  83<H>  /  ALT  159<H>  /  AlkPhos  74  03-20    proBNP:   Lipid Profile:   HgA1c:   TSH:     TELE:  EKG:

## 2022-03-20 NOTE — DISCHARGE NOTE PROVIDER - CARE PROVIDER_API CALL
Willis Zarco)  Surgery  [address], [place] [address]  Phone: [phone]  Fax: [phone]  Established Patient  Follow Up Time: 2 weeks

## 2022-03-20 NOTE — DISCHARGE NOTE PROVIDER - HOSPITAL COURSE
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 Now, s/p 3/16 right hepatectomy, cholecystectomy. Postoperative course was uncomplicated. Pain initially managed with IV PCA post op and then she was switched to PO pain meds with no issues. She was also restarted on her home clonazepam. The patient was treated via the liver pathway and phosphorous was repleted accordingly. On POD 3, patient feels well and is ready to be discharged. OOB and voiding without difficulty.

## 2022-03-20 NOTE — PROGRESS NOTE ADULT - ASSESSMENT
s/p liver lobe resection  s/p cholecystectomy  CV stable  fu with surgery     DVT proph  on lovenox    Mild HTN  now BP much better  no need for Rx for now

## 2022-03-20 NOTE — DISCHARGE NOTE PROVIDER - NSDCFUADDINST_GEN_ALL_CORE_FT
Allow soapy water to run over incision site. Do not rub incision site. Pat dry when out of shower.    -You may resume your regular diet and regular medication regimen.      A prescription for oxycodone has been sent to the pharmacy. You should only take these for severe pain. For mild or moderate pain, you may take 975mg of tylenol every 6 hours. Do not exceed more than 4G per day. You may also take ibuprofen 400mg every 6 hours. It is most effective to alternate these two medicines with each other, 3 hours apart.    -Call your physician if you have a fever over 101F.    -Make a follow up appointment for with Dr. Ferguson when you arrive home (or the next business day) for 2 weeks from today.    -No heavy lifting until you follow up with your physician and get clearance from him.   Allow soapy water to run over incision site. Do not rub incision site. Pat dry when out of shower.    -You may resume your regular diet and regular medication regimen.      A prescription for oxycodone has been sent to the pharmacy. You should only take these for severe pain. For mild or moderate pain, you may take 975mg of tylenol every 6 hours. Do not exceed more than 4G per day. You may also take ibuprofen 400mg every 6 hours. It is most effective to alternate these two medicines with each other, 3 hours apart.    -Call your physician if you have a fever over 101F.    -Make a follow up appointment for with Dr. Ferguson when you arrive home (or the next business day) for 2 weeks from today.    -No heavy lifting until you follow up with your physician and get clearance from him.    -Please take potassium phosphate sent to pharmacy twice a day for 3 days.

## 2022-03-20 NOTE — PROGRESS NOTE ADULT - ASSESSMENT
62F with biliary cystadenoma. Now, s/p 3/16 right hepatectomy, cholecystectomy.    PLAN  - Liver pathway 62F with biliary cystadenoma. Now, s/p 3/16 right hepatectomy, cholecystectomy.    PLAN  - Liver pathway - replete phos  - Pain: PCA off, oxy PRN  - DVT PPx: LNX  - Diet: Regular  - IVL  - 20 mg lasix --> f/u U/O  - Trend LFTs  - probably d/c today    D Team Surgery  [id number]

## 2022-03-20 NOTE — DISCHARGE NOTE PROVIDER - NSDCMRMEDTOKEN_GEN_ALL_CORE_FT
aspirin 81 mg oral tablet: orally once a day  LD 03/09/2022  clonazePAM 1 mg oral tablet: 1 tab(s) orally 3 times a day  hydroxyurea 1000 mg oral tablet: orally every other day  hydroxyurea 500 mg oral capsule: orally every other day  Melatonin 3 mg oral tablet: 1 tab(s) orally once a day (at bedtime), As Needed   aspirin 81 mg oral tablet: orally once a day  LD 03/09/2022  clonazePAM 1 mg oral tablet: 1 tab(s) orally 3 times a day  hydroxyurea 1000 mg oral tablet: orally every other day  hydroxyurea 500 mg oral capsule: orally every other day  K-Phos No. 2 oral tablet: 1 tab(s) orally 2 times a day   Melatonin 3 mg oral tablet: 1 tab(s) orally once a day (at bedtime), As Needed  oxyCODONE 5 mg oral tablet: 1 tab(s) orally every 6 hours, As Needed -for severe pain MDD:Four

## 2022-03-20 NOTE — DISCHARGE NOTE NURSING/CASE MANAGEMENT/SOCIAL WORK - NSDCPEFALRISK_GEN_ALL_CORE
For information on Fall & Injury Prevention, visit: [email or web address] OR  [email or web address] OR  https://www.cdc.gov/steadi/patient.html

## 2022-03-20 NOTE — PROGRESS NOTE ADULT - SUBJECTIVE AND OBJECTIVE BOX
SURGERY DAILY PROGRESS NOTE:     PROCEDURE: 3/16 right hepatectomy, cholecystectomy for liver mass.     INTERVAL EVENTS: Passed TOV    SUBJECTIVE/ROS: No acute events overnight. Patient seen and examined bedside on AM rounds.    OBJECTIVE: T(C): 36.6 (03-20-22 @ 05:46), Max: 37.5 (03-19-22 @ 20:51)  HR: 76 (03-20-22 @ 05:46) (64 - 89)  BP: 104/50 (03-20-22 @ 05:46) (101/67 - 140/79)  RR: 17 (03-20-22 @ 05:46) (16 - 18)  SpO2: 96% (03-20-22 @ 05:46) (92% - 96%)  Wt(kg): --  I&O's Summary    18 Mar 2022 07:01  -  19 Mar 2022 07:00  --------------------------------------------------------  IN: 1161 mL / OUT: 1550 mL / NET: -389 mL    19 Mar 2022 07:01  -  20 Mar 2022 06:37  --------------------------------------------------------  IN: 0 mL / OUT: 400 mL / NET: -400 mL      I&O's Detail    18 Mar 2022 07:01  -  19 Mar 2022 07:00  --------------------------------------------------------  IN:    IV PiggyBack: 550 mL    Oral Fluid: 275 mL    sodium chloride 0.9%: 126 mL    sodium chloride 0.9%: 210 mL  Total IN: 1161 mL    OUT:    Ureteral Catheter (mL): 1100 mL    Voided (mL): 450 mL  Total OUT: 1550 mL    Total NET: -389 mL      19 Mar 2022 07:01  -  20 Mar 2022 06:37  --------------------------------------------------------  IN:  Total IN: 0 mL    OUT:    Voided (mL): 400 mL  Total OUT: 400 mL    Total NET: -400 mL        PHYSICAL EXAM:  Constitutional: NAD  Respiratory: non-labored breathing, patent airway  Gastrointestinal: R sided hockey-stick incision with tenderness, nondistended  Extremities: warm  Neurological: intact    MEDICATIONS  (STANDING):  acetaminophen     Tablet .. 500 milliGRAM(s) Oral every 6 hours  enoxaparin Injectable 40 milliGRAM(s) SubCutaneous every 24 hours  ketorolac   Injectable 15 milliGRAM(s) IV Push every 6 hours  potassium phosphate / sodium phosphate Tablet (K-PHOS No. 2) 2 Tablet(s) Oral four times a day with meals    MEDICATIONS  (PRN):  clonazePAM  Tablet 0.5 milliGRAM(s) Oral every 8 hours PRN Anxiety  diphenhydrAMINE Injectable 12.5 milliGRAM(s) IV Push every 4 hours PRN Pruritus  nalbuphine Injectable 2.5 milliGRAM(s) IV Push every 6 hours PRN Pruritus  ondansetron Injectable 4 milliGRAM(s) IV Push every 6 hours PRN Nausea  oxyCODONE    IR 5 milliGRAM(s) Oral every 6 hours PRN Moderate Pain (4 - 6)  oxyCODONE    IR 10 milliGRAM(s) Oral every 6 hours PRN Severe Pain (7 - 10)      LABS:                        13.1   7.47  )-----------( 245      ( 19 Mar 2022 06:28 )             39.2     03-19    136  |  97<L>  |  7   ----------------------------<  92  3.8   |  26  |  0.56    Ca    8.1<L>      19 Mar 2022 06:28  Phos  3.5     03-19  Mg     2.40     03-19    TPro  5.8<L>  /  Alb  3.4  /  TBili  1.4<H>  /  DBili  x   /  AST  149<H>  /  ALT  217<H>  /  AlkPhos  85  03-19    PTT - ( 19 Mar 2022 06:28 )  PTT:27.2 sec         SURGERY DAILY PROGRESS NOTE:     PROCEDURE: 3/16 right hepatectomy, cholecystectomy for liver mass.     INTERVAL EVENTS: Passed TOV    SUBJECTIVE/ROS: No acute events overnight. Patient seen and examined bedside on AM rounds. Patient states that pain is controlled. Tolerating diet with no nausea or vomiting. Passing gas with no BMs. Getting OOB.     OBJECTIVE: T(C): 36.6 (03-20-22 @ 05:46), Max: 37.5 (03-19-22 @ 20:51)  HR: 76 (03-20-22 @ 05:46) (64 - 89)  BP: 104/50 (03-20-22 @ 05:46) (101/67 - 140/79)  RR: 17 (03-20-22 @ 05:46) (16 - 18)  SpO2: 96% (03-20-22 @ 05:46) (92% - 96%)  Wt(kg): --  I&O's Summary    18 Mar 2022 07:01  -  19 Mar 2022 07:00  --------------------------------------------------------  IN: 1161 mL / OUT: 1550 mL / NET: -389 mL    19 Mar 2022 07:01  -  20 Mar 2022 06:37  --------------------------------------------------------  IN: 0 mL / OUT: 400 mL / NET: -400 mL      I&O's Detail    18 Mar 2022 07:01  -  19 Mar 2022 07:00  --------------------------------------------------------  IN:    IV PiggyBack: 550 mL    Oral Fluid: 275 mL    sodium chloride 0.9%: 126 mL    sodium chloride 0.9%: 210 mL  Total IN: 1161 mL    OUT:    Ureteral Catheter (mL): 1100 mL    Voided (mL): 450 mL  Total OUT: 1550 mL    Total NET: -389 mL      19 Mar 2022 07:01  -  20 Mar 2022 06:37  --------------------------------------------------------  IN:  Total IN: 0 mL    OUT:    Voided (mL): 400 mL  Total OUT: 400 mL    Total NET: -400 mL        PHYSICAL EXAM:  Constitutional: NAD  Respiratory: non-labored breathing, patent airway  Gastrointestinal: R sided hockey-stick incision with tenderness, nondistended  Extremities: warm  Neurological: intact    MEDICATIONS  (STANDING):  acetaminophen     Tablet .. 500 milliGRAM(s) Oral every 6 hours  enoxaparin Injectable 40 milliGRAM(s) SubCutaneous every 24 hours  ketorolac   Injectable 15 milliGRAM(s) IV Push every 6 hours  potassium phosphate / sodium phosphate Tablet (K-PHOS No. 2) 2 Tablet(s) Oral four times a day with meals    MEDICATIONS  (PRN):  clonazePAM  Tablet 0.5 milliGRAM(s) Oral every 8 hours PRN Anxiety  diphenhydrAMINE Injectable 12.5 milliGRAM(s) IV Push every 4 hours PRN Pruritus  nalbuphine Injectable 2.5 milliGRAM(s) IV Push every 6 hours PRN Pruritus  ondansetron Injectable 4 milliGRAM(s) IV Push every 6 hours PRN Nausea  oxyCODONE    IR 5 milliGRAM(s) Oral every 6 hours PRN Moderate Pain (4 - 6)  oxyCODONE    IR 10 milliGRAM(s) Oral every 6 hours PRN Severe Pain (7 - 10)      LABS:                        13.1   7.47  )-----------( 245      ( 19 Mar 2022 06:28 )             39.2     03-19    136  |  97<L>  |  7   ----------------------------<  92  3.8   |  26  |  0.56    Ca    8.1<L>      19 Mar 2022 06:28  Phos  3.5     03-19  Mg     2.40     03-19    TPro  5.8<L>  /  Alb  3.4  /  TBili  1.4<H>  /  DBili  x   /  AST  149<H>  /  ALT  217<H>  /  AlkPhos  85  03-19    PTT - ( 19 Mar 2022 06:28 )  PTT:27.2 sec

## 2022-03-23 RX ORDER — OXYCODONE 5 MG/1
5 TABLET ORAL
Qty: 25 | Refills: 0 | Status: ACTIVE | COMMUNITY
Start: 2022-03-23 | End: 1900-01-01

## 2022-03-29 LAB — SURGICAL PATHOLOGY STUDY: SIGNIFICANT CHANGE UP

## 2022-03-31 PROBLEM — Z86.2 PERSONAL HISTORY OF DISEASES OF THE BLOOD AND BLOOD-FORMING ORGANS AND CERTAIN DISORDERS INVOLVING THE IMMUNE MECHANISM: Chronic | Status: ACTIVE | Noted: 2022-03-11

## 2022-03-31 PROBLEM — K83.5 BILIARY CYST: Chronic | Status: ACTIVE | Noted: 2022-03-11

## 2022-04-05 ENCOUNTER — APPOINTMENT (OUTPATIENT)
Dept: SURGICAL ONCOLOGY | Facility: CLINIC | Age: 62
End: 2022-04-05
Payer: COMMERCIAL

## 2022-04-05 VITALS
RESPIRATION RATE: 16 BRPM | WEIGHT: 175 LBS | BODY MASS INDEX: 29.88 KG/M2 | HEART RATE: 80 BPM | TEMPERATURE: 97.9 F | OXYGEN SATURATION: 95 % | SYSTOLIC BLOOD PRESSURE: 124 MMHG | HEIGHT: 64 IN | DIASTOLIC BLOOD PRESSURE: 92 MMHG

## 2022-04-05 DIAGNOSIS — K83.5 BILIARY CYST: ICD-10-CM

## 2022-04-05 PROCEDURE — 99024 POSTOP FOLLOW-UP VISIT: CPT

## 2022-04-11 PROBLEM — K83.5 BILIARY CYST: Status: ACTIVE | Noted: 2021-12-23

## 2022-05-27 ENCOUNTER — APPOINTMENT (OUTPATIENT)
Dept: SURGICAL ONCOLOGY | Facility: CLINIC | Age: 62
End: 2022-05-27
Payer: COMMERCIAL

## 2022-05-27 VITALS
SYSTOLIC BLOOD PRESSURE: 133 MMHG | TEMPERATURE: 96.9 F | DIASTOLIC BLOOD PRESSURE: 90 MMHG | HEART RATE: 83 BPM | BODY MASS INDEX: 29.88 KG/M2 | OXYGEN SATURATION: 97 % | WEIGHT: 175 LBS | RESPIRATION RATE: 17 BRPM | HEIGHT: 64 IN

## 2022-05-27 PROCEDURE — 99024 POSTOP FOLLOW-UP VISIT: CPT

## 2022-06-06 NOTE — ASSESSMENT
[FreeTextEntry1] : Ms. ARNOLD BERG is a 62 year old who presents for post-operative evaluation after a right hepatectomy with cholecystectomy on 3/16/22. We reviewed final pathology demonstrating a 8.7 cm simple biliary cyst. Discussed that this is good news and will not require additional surveillance for this pathology. \par We discussed her post-operative and recovery period and restrictions moving forward which include no lifting greater than 10 lbs for 8 weeks. We discussed dietary options and I encouraged the patient to advance as tolerated.  Reviewed that she just had a major operation and she will need some time for healing and we do not expect that she will be back to her baseline at this point in her recovery.  Reminded her that it will take approximately 8-10 weeks to get back to her baseline and what she is experiencing is situational and common after surgery. She verbalized understanding. Discussed that she will need at least 8-10 weeks off from work and should not lift in this timeframe. Ms. BERG will contact the office with any concerns or questions and can see us as needed should anything arise in the interim. \par \par

## 2022-06-06 NOTE — PHYSICAL EXAM
[Normal] : oriented to person, place and time, with appropriate affect [de-identified] : incision CDI, well approximated

## 2022-06-06 NOTE — HISTORY OF PRESENT ILLNESS
[FreeTextEntry1] : Ms. ARNOLD BERG is a 62 year old who presents for post-operative evaluation after a right hepatectomy with cholecystectomy on 3/16/22. She had some abdominal discomfort prior to visit and therefore called for appointment. Since then she feels much better.  She was feeling depressed at last visit and reports being in a better mental state now.   Denies nausea, vomiting or diarrhea.  Her appetite is improved, weight is stable. Her incision is well approximated, c/d/i without drainage. \par \par

## 2022-06-08 NOTE — ASSESSMENT
[FreeTextEntry1] : Ms. ARNOLD BERG is a 62 year old who presents for post-operative evaluation after a right hepatectomy with cholecystectomy on 3/16/22. Final pathology demonstrating a 8.7 cm simple biliary cyst. \par She is doing better than last visit and feeling more "normal", tolerating PO diet. She had some pain prior to making today's appointment which has since resolved.  Ms. BERG will contact the office with any concerns or questions and can see us as needed should anything arise in the interim. \par \par

## 2022-08-07 ENCOUNTER — NON-APPOINTMENT (OUTPATIENT)
Age: 62
End: 2022-08-07

## 2022-08-08 ENCOUNTER — TRANSCRIPTION ENCOUNTER (OUTPATIENT)
Age: 62
End: 2022-08-08

## 2022-08-09 ENCOUNTER — APPOINTMENT (OUTPATIENT)
Dept: DISASTER EMERGENCY | Facility: HOSPITAL | Age: 62
End: 2022-08-09

## 2022-08-09 ENCOUNTER — OUTPATIENT (OUTPATIENT)
Dept: OUTPATIENT SERVICES | Facility: HOSPITAL | Age: 62
LOS: 1 days | End: 2022-08-09

## 2022-08-09 VITALS
SYSTOLIC BLOOD PRESSURE: 135 MMHG | DIASTOLIC BLOOD PRESSURE: 91 MMHG | HEIGHT: 64 IN | OXYGEN SATURATION: 98 % | RESPIRATION RATE: 17 BRPM | HEART RATE: 100 BPM | TEMPERATURE: 98 F | WEIGHT: 170.2 LBS

## 2022-08-09 VITALS
DIASTOLIC BLOOD PRESSURE: 91 MMHG | OXYGEN SATURATION: 96 % | RESPIRATION RATE: 17 BRPM | SYSTOLIC BLOOD PRESSURE: 137 MMHG | TEMPERATURE: 100 F | HEART RATE: 92 BPM

## 2022-08-09 DIAGNOSIS — U07.1 COVID-19: ICD-10-CM

## 2022-08-09 DIAGNOSIS — Z98.891 HISTORY OF UTERINE SCAR FROM PREVIOUS SURGERY: Chronic | ICD-10-CM

## 2022-08-09 DIAGNOSIS — Z90.89 ACQUIRED ABSENCE OF OTHER ORGANS: Chronic | ICD-10-CM

## 2022-08-09 RX ORDER — BEBTELOVIMAB 87.5 MG/ML
175 INJECTION, SOLUTION INTRAVENOUS ONCE
Refills: 0 | Status: COMPLETED | OUTPATIENT
Start: 2022-08-09 | End: 2022-08-09

## 2022-08-09 RX ADMIN — BEBTELOVIMAB 175 MILLIGRAM(S): 87.5 INJECTION, SOLUTION INTRAVENOUS at 09:45

## 2022-08-09 NOTE — MONOCLONAL ANTIBODY INFUSION - HOME MEDICATIONS
oxyCODONE 5 mg oral tablet , 1 tab(s) orally every 6 hours, As Needed -for severe pain MDD:Four  K-Phos No. 2 oral tablet , 1 tab(s) orally 2 times a day   hydroxyurea 1000 mg oral tablet , orally every other day  hydroxyurea 500 mg oral capsule , orally every other day  Melatonin 3 mg oral tablet , 1 tab(s) orally once a day (at bedtime), As Needed  clonazePAM 1 mg oral tablet , 1 tab(s) orally 3 times a day  aspirin 81 mg oral tablet , orally once a day  LD 03/09/2022

## 2022-08-09 NOTE — MONOCLONAL ANTIBODY INFUSION - EXAM
CC: Monoclonal Antibody Infusion/COVID 19 Positive  62yFemale    exam/findings:  T(C): 36.9 (08-09-22 @ 09:29), Max: 36.9 (08-09-22 @ 09:29)  HR: 100 (08-09-22 @ 09:29) (100 - 100)  BP: 135/91 (08-09-22 @ 09:29) (135/91 - 135/91)  RR: 17 (08-09-22 @ 09:29) (17 - 17)  SpO2: 98% (08-09-22 @ 09:29) (98% - 98%)      PE:   Appearance: NAD	  HEENT:   Normal oral mucosa,   Lymphatic: No lymphadenopathy  Cardiovascular: Normal S1 S2, No JVD, No murmurs, No edema  Respiratory: Lungs clear to auscultation	  Gastrointestinal:  Soft, Non-tender, + BS	  Skin: warm and dry  Neurologic: Non-focal  Extremities: Normal range of motion,

## 2022-08-09 NOTE — MONOCLONAL ANTIBODY INFUSION - ASSESSMENT AND PLAN
This is a 62 yrs old female with PMHx of polycythemia vera, liver and gall bladder surgery in 2022 for ? tumor and recently diagnosed with Covid-19 infection who was referred for monoclonal antibody infusion by provider in urgent care after testing positive for COVID 19 on 8/8/22.  Patient states he has been experiencing HA, cough, sore throat, malaise, and nasal congestion since 8/7/22. He denies any CP, SOB, chills, numbness/tingling in b/l limbs, loss of sensation or motor function, N/V/D. Pt is vaccinated and boosted with pfizer x 3.    PLAN:  - Injection procedure explained to patient   - Consent for monoclonal antibody injection obtained   - Risk & benefits discussed/all questions answered  - Inject Bebtelovimab 175 mg over 1 minute.  - Observe patient for one hour post administration    I have reviewed the Bebtelovimab Emergency Use Authorization (EUA) and I have provided the patient or patient's caregiver with the following information:    1. FDA has authorized emergency use Bebtelovimab, which is not an FDA-approved biological product.  2. The patient or patient's caregiver has the option to accept or refuse administration of Bebtelovimab.  3. The significant known and potential risks and benefits of Bebtelovimab and the extent to which such risks and benefits are unknown.  4. Information on available alternative treatments and risks and benefits of those alternatives.    The patient's COVID monoclonal antibody injection administration went well without any complications. The patient tolerated the treatment without any reactions. Vitals were stable throughout the injection & post-injection administration. The pt denies any CP, fevers, chills, SOB, numbness/tingling in b/l limbs, loss of sensation or motor function, N/V/D while receiving the injection. Patient denies any symptoms an hour after post injection. Vitals were taken post injection and were stable. Pt is medically stable to be discharged home. Discharge instructions were provided to the patient with a fact sheet included. Patient was instructed to self-isolate and use infection control measures (e.g wear mask, isolate, social distance, avoid sharing personal items, clean and disinfect "high touch" surfaces, and frequent handwashing according to the CDC guidelines. The patient was informed on what symptoms to be aware of for the next couple of days, and if there are any issues to call the 24/7 clinical call center. Patient was instructed to follow up with PCP as needed.

## 2022-08-22 ENCOUNTER — NON-APPOINTMENT (OUTPATIENT)
Age: 62
End: 2022-08-22

## 2022-10-22 ENCOUNTER — NON-APPOINTMENT (OUTPATIENT)
Age: 62
End: 2022-10-22

## 2022-10-25 ENCOUNTER — NON-APPOINTMENT (OUTPATIENT)
Age: 62
End: 2022-10-25

## 2022-10-25 ENCOUNTER — APPOINTMENT (OUTPATIENT)
Dept: COLORECTAL SURGERY | Facility: CLINIC | Age: 62
End: 2022-10-25

## 2022-10-25 DIAGNOSIS — N81.6 RECTOCELE: ICD-10-CM

## 2022-10-25 DIAGNOSIS — M62.89 OTHER SPECIFIED DISORDERS OF MUSCLE: ICD-10-CM

## 2022-10-25 PROCEDURE — 46600 DIAGNOSTIC ANOSCOPY SPX: CPT

## 2022-10-25 PROCEDURE — 99203 OFFICE O/P NEW LOW 30 MIN: CPT | Mod: 25

## 2022-10-27 PROBLEM — N81.6 RECTOCELE: Status: ACTIVE | Noted: 2022-10-27

## 2022-10-27 PROBLEM — M62.89 PELVIC FLOOR DYSFUNCTION: Status: ACTIVE | Noted: 2022-10-26

## 2022-10-27 NOTE — REVIEW OF SYSTEMS
[As Noted in HPI] : as noted in HPI [Constipation] : constipation [Negative] : Heme/Lymph [FreeTextEntry7] : difficulty with evacuating bowel movements

## 2022-10-27 NOTE — ASSESSMENT
[FreeTextEntry1] : 62 year old female with rectocele and i suspect pelvic floor disorder. recommend mri defocography, high fiber diet, metamucil daily, pelvic floor therapy PT/biofeedback

## 2022-10-27 NOTE — PROCEDURE
[FreeTextEntry1] : after consent, anoscopy was done. showing internal hemorrhoids and 4 cm rectocele.

## 2022-10-27 NOTE — PHYSICAL EXAM
[Abdomen Masses] : No abdominal masses [Abdomen Tenderness] : ~T No ~M abdominal tenderness [Normal rectal exam] : exam was normal [Tender, Swollen] : tender, swollen [Lax] : was lax [None] : there was no rectal mass  [Normal Breath Sounds] : Normal breath sounds [Normal Heart Sounds] : normal heart sounds [No Rash or Lesion] : No rash or lesion [Alert] : alert [Oriented to Person] : oriented to person [Oriented to Place] : oriented to place [Oriented to Time] : oriented to time [Calm] : calm [de-identified] : 4 cm rectocele [de-identified] : looks well nad

## 2022-10-27 NOTE — HISTORY OF PRESENT ILLNESS
[FreeTextEntry1] : consultation from Dr. Moreno for pelvic floor disorder, rectocele. 62 year old female with complaints of difficulty evacuating bowel movements for several years, symptoms have been worsening.

## 2022-11-02 ENCOUNTER — APPOINTMENT (OUTPATIENT)
Dept: MRI IMAGING | Facility: CLINIC | Age: 62
End: 2022-11-02

## 2022-11-02 ENCOUNTER — OUTPATIENT (OUTPATIENT)
Dept: OUTPATIENT SERVICES | Facility: HOSPITAL | Age: 62
LOS: 1 days | End: 2022-11-02
Payer: COMMERCIAL

## 2022-11-02 DIAGNOSIS — Z98.891 HISTORY OF UTERINE SCAR FROM PREVIOUS SURGERY: Chronic | ICD-10-CM

## 2022-11-02 DIAGNOSIS — Z90.89 ACQUIRED ABSENCE OF OTHER ORGANS: Chronic | ICD-10-CM

## 2022-11-02 DIAGNOSIS — M62.89 OTHER SPECIFIED DISORDERS OF MUSCLE: ICD-10-CM

## 2022-11-02 PROCEDURE — 72195 MRI PELVIS W/O DYE: CPT

## 2022-11-02 PROCEDURE — 72195 MRI PELVIS W/O DYE: CPT | Mod: 26

## 2022-12-30 ENCOUNTER — APPOINTMENT (OUTPATIENT)
Dept: COLORECTAL SURGERY | Facility: CLINIC | Age: 62
End: 2022-12-30

## 2023-03-20 NOTE — H&P PST ADULT - NSANTHTIREDRD_ENT_A_CORE
Department of Anesthesiology  Postprocedure Note    Patient: Shaye Pena  MRN: [id number]  YOB: 1955  Date of evaluation: 3/20/2023      Procedure Summary     Date: 03/20/23 Room / Location: [place] [id number] / [facility]    Anesthesia Start: 2629 Anesthesia Stop: 1108    Procedures:       EGD BIOPSY      COLON W/ANES. Diagnosis:       Special screening for malignant neoplasms, colon      Chronic GERD      (SCREENING, GERD)    Surgeons: Deven Miller MD Responsible Provider: Dominic Webb MD    Anesthesia Type: TIVA ASA Status: 2          Anesthesia Type: No value filed.     Elida Phase I: Elida Score: 10    Elida Phase II: Elida Score: 10    Vitals:    03/20/23 1114 03/20/23 1124 03/20/23 1139 03/20/23 1140   BP: 112/69 113/77  120/87   Pulse: 65 65 69 70   Resp: 20 21  20   Temp:       TempSrc:       SpO2: 93% 94%  96%   Weight:       Height:         Anesthesia Post Evaluation    Patient location during evaluation: bedside  Patient participation: complete - patient participated  Level of consciousness: awake and alert  Airway patency: patent  Nausea & Vomiting: no nausea  Complications: no  Cardiovascular status: hemodynamically stable  Respiratory status: acceptable  Hydration status: euvolemic No

## 2023-05-06 NOTE — PHYSICAL EXAM
[Normal] : oriented to person, place and time, with appropriate affect [de-identified] : incision CDI, well approximated Yes

## 2023-08-13 ENCOUNTER — NON-APPOINTMENT (OUTPATIENT)
Age: 63
End: 2023-08-13

## 2023-08-21 ENCOUNTER — APPOINTMENT (OUTPATIENT)
Dept: OBGYN | Facility: CLINIC | Age: 63
End: 2023-08-21

## 2023-08-21 VITALS
BODY MASS INDEX: 29.71 KG/M2 | SYSTOLIC BLOOD PRESSURE: 108 MMHG | WEIGHT: 174 LBS | DIASTOLIC BLOOD PRESSURE: 66 MMHG | HEIGHT: 64 IN

## 2023-09-05 ENCOUNTER — NON-APPOINTMENT (OUTPATIENT)
Age: 63
End: 2023-09-05

## 2024-11-15 ENCOUNTER — APPOINTMENT (OUTPATIENT)
Dept: ORTHOPEDIC SURGERY | Facility: CLINIC | Age: 64
End: 2024-11-15
Payer: COMMERCIAL

## 2024-11-15 DIAGNOSIS — M75.32 CALCIFIC TENDINITIS OF LEFT SHOULDER: ICD-10-CM

## 2024-11-15 DIAGNOSIS — M75.52 BURSITIS OF LEFT SHOULDER: ICD-10-CM

## 2024-11-15 DIAGNOSIS — M25.512 PAIN IN LEFT SHOULDER: ICD-10-CM

## 2024-11-15 PROCEDURE — 99202 OFFICE O/P NEW SF 15 MIN: CPT | Mod: 25

## 2024-11-15 PROCEDURE — 73010 X-RAY EXAM OF SHOULDER BLADE: CPT | Mod: LT

## 2024-11-15 PROCEDURE — 73030 X-RAY EXAM OF SHOULDER: CPT | Mod: LT

## 2024-12-11 ENCOUNTER — NON-APPOINTMENT (OUTPATIENT)
Age: 64
End: 2024-12-11

## 2024-12-12 ENCOUNTER — APPOINTMENT (OUTPATIENT)
Dept: OBGYN | Facility: CLINIC | Age: 64
End: 2024-12-12
Payer: COMMERCIAL

## 2024-12-12 VITALS
BODY MASS INDEX: 31.24 KG/M2 | RESPIRATION RATE: 17 BRPM | WEIGHT: 183 LBS | SYSTOLIC BLOOD PRESSURE: 130 MMHG | DIASTOLIC BLOOD PRESSURE: 92 MMHG | HEIGHT: 64 IN

## 2024-12-12 DIAGNOSIS — Z82.49 FAMILY HISTORY OF ISCHEMIC HEART DISEASE AND OTHER DISEASES OF THE CIRCULATORY SYSTEM: ICD-10-CM

## 2024-12-12 DIAGNOSIS — Z12.4 ENCOUNTER FOR SCREENING FOR MALIGNANT NEOPLASM OF CERVIX: ICD-10-CM

## 2024-12-12 DIAGNOSIS — R87.89 OTHER ABNORMAL FINDINGS IN SPECIMENS FROM FEMALE GENITAL ORGANS: ICD-10-CM

## 2024-12-12 DIAGNOSIS — Z82.62 FAMILY HISTORY OF OSTEOPOROSIS: ICD-10-CM

## 2024-12-12 DIAGNOSIS — Z80.49 FAMILY HISTORY OF MALIGNANT NEOPLASM OF OTHER GENITAL ORGANS: ICD-10-CM

## 2024-12-12 DIAGNOSIS — N90.7 VULVAR CYST: ICD-10-CM

## 2024-12-12 DIAGNOSIS — Z72.0 TOBACCO USE: ICD-10-CM

## 2024-12-12 DIAGNOSIS — Z78.9 OTHER SPECIFIED HEALTH STATUS: ICD-10-CM

## 2024-12-12 DIAGNOSIS — Z01.419 ENCOUNTER FOR GYNECOLOGICAL EXAMINATION (GENERAL) (ROUTINE) W/OUT ABNORMAL FINDINGS: ICD-10-CM

## 2024-12-12 PROCEDURE — 99386 PREV VISIT NEW AGE 40-64: CPT

## 2024-12-12 RX ORDER — HYDROXYUREA 500 MG/1
CAPSULE ORAL
Refills: 0 | Status: ACTIVE | COMMUNITY

## 2024-12-12 RX ORDER — ZOLPIDEM TARTRATE 5 MG/1
TABLET, FILM COATED ORAL
Refills: 0 | Status: ACTIVE | COMMUNITY

## 2024-12-16 ENCOUNTER — TRANSCRIPTION ENCOUNTER (OUTPATIENT)
Age: 64
End: 2024-12-16

## 2024-12-16 LAB
CYTOLOGY CVX/VAG DOC THIN PREP: NORMAL
HPV HIGH+LOW RISK DNA PNL CVX: NOT DETECTED

## 2025-01-28 ENCOUNTER — TRANSCRIPTION ENCOUNTER (OUTPATIENT)
Age: 65
End: 2025-01-28

## 2025-01-28 DIAGNOSIS — R14.0 ABDOMINAL DISTENSION (GASEOUS): ICD-10-CM

## 2025-01-28 DIAGNOSIS — R10.2 PELVIC AND PERINEAL PAIN: ICD-10-CM

## 2025-01-29 ENCOUNTER — APPOINTMENT (OUTPATIENT)
Dept: ULTRASOUND IMAGING | Facility: CLINIC | Age: 65
End: 2025-01-29
Payer: COMMERCIAL

## 2025-01-29 ENCOUNTER — OUTPATIENT (OUTPATIENT)
Dept: OUTPATIENT SERVICES | Facility: HOSPITAL | Age: 65
LOS: 1 days | End: 2025-01-29
Payer: COMMERCIAL

## 2025-01-29 DIAGNOSIS — Z98.891 HISTORY OF UTERINE SCAR FROM PREVIOUS SURGERY: Chronic | ICD-10-CM

## 2025-01-29 DIAGNOSIS — R10.2 PELVIC AND PERINEAL PAIN: ICD-10-CM

## 2025-01-29 DIAGNOSIS — R14.0 ABDOMINAL DISTENSION (GASEOUS): ICD-10-CM

## 2025-01-29 DIAGNOSIS — Z90.89 ACQUIRED ABSENCE OF OTHER ORGANS: Chronic | ICD-10-CM

## 2025-01-29 PROCEDURE — 76830 TRANSVAGINAL US NON-OB: CPT

## 2025-01-29 PROCEDURE — 76856 US EXAM PELVIC COMPLETE: CPT

## 2025-01-29 PROCEDURE — 76830 TRANSVAGINAL US NON-OB: CPT | Mod: 26

## 2025-01-29 PROCEDURE — 76856 US EXAM PELVIC COMPLETE: CPT | Mod: 26

## 2025-02-06 ENCOUNTER — RESULT REVIEW (OUTPATIENT)
Age: 65
End: 2025-02-06

## 2025-02-14 DIAGNOSIS — R93.89 ABNORMAL FINDINGS ON DIAGNOSTIC IMAGING OF OTHER SPECIFIED BODY STRUCTURES: ICD-10-CM

## 2025-03-14 ENCOUNTER — TRANSCRIPTION ENCOUNTER (OUTPATIENT)
Age: 65
End: 2025-03-14

## 2025-05-01 ENCOUNTER — OUTPATIENT (OUTPATIENT)
Dept: OUTPATIENT SERVICES | Facility: HOSPITAL | Age: 65
LOS: 1 days | End: 2025-05-01
Payer: COMMERCIAL

## 2025-05-01 ENCOUNTER — APPOINTMENT (OUTPATIENT)
Dept: ULTRASOUND IMAGING | Facility: CLINIC | Age: 65
End: 2025-05-01
Payer: COMMERCIAL

## 2025-05-01 DIAGNOSIS — R93.89 ABNORMAL FINDINGS ON DIAGNOSTIC IMAGING OF OTHER SPECIFIED BODY STRUCTURES: ICD-10-CM

## 2025-05-01 DIAGNOSIS — Z90.89 ACQUIRED ABSENCE OF OTHER ORGANS: Chronic | ICD-10-CM

## 2025-05-01 DIAGNOSIS — Z00.8 ENCOUNTER FOR OTHER GENERAL EXAMINATION: ICD-10-CM

## 2025-05-01 DIAGNOSIS — Z98.891 HISTORY OF UTERINE SCAR FROM PREVIOUS SURGERY: Chronic | ICD-10-CM

## 2025-05-01 PROCEDURE — 76830 TRANSVAGINAL US NON-OB: CPT | Mod: 26

## 2025-05-01 PROCEDURE — 76856 US EXAM PELVIC COMPLETE: CPT | Mod: 26

## 2025-05-01 PROCEDURE — 76856 US EXAM PELVIC COMPLETE: CPT

## 2025-05-01 PROCEDURE — 76830 TRANSVAGINAL US NON-OB: CPT

## (undated) DEVICE — SUT PDS II 0 18" ENDOLOOP LIGATURE

## (undated) DEVICE — SUT SILK 3-0 18" SH (POP-OFF)

## (undated) DEVICE — BAG URINE W METER 2L

## (undated) DEVICE — SUT SILK 2-0 18" TIES

## (undated) DEVICE — POSITIONER PATIENT SAFETY STRAP 3X60"

## (undated) DEVICE — SUT SILK 0 18" TIES

## (undated) DEVICE — SUT SILK 4-0 30" SH

## (undated) DEVICE — SUT SILK 3-0 18" TIES

## (undated) DEVICE — FEEDING TUBE NG KANGAROO POLYURETHANE 5FR 51CM

## (undated) DEVICE — WARMING BLANKET LOWER ADULT

## (undated) DEVICE — ELCTR BOVIE TIP BLADE INSULATED 6.5" EDGE

## (undated) DEVICE — ELCTR GROUNDING PAD ADULT COVIDIEN

## (undated) DEVICE — SUT VICRYL 2 27" TP-1 UNDYED

## (undated) DEVICE — GLV 8 PROTEXIS (WHITE)

## (undated) DEVICE — PACK MAJOR ABDOMINAL WITH LAP

## (undated) DEVICE — SUT PROLENE 5-0 36" RB-1

## (undated) DEVICE — LIGASURE IMPACT

## (undated) DEVICE — STAPLER COVIDIEN ENDO GIA SHORT HANDLE

## (undated) DEVICE — SUT PROLENE 4-0 36" RB-1

## (undated) DEVICE — SOL IRR POUR H2O 1500ML

## (undated) DEVICE — ELCTR AQUAMANTYS BIPOLAR SEALER 6.0

## (undated) DEVICE — TAPE UMBILICAL 1/8 X 30" STRANDS

## (undated) DEVICE — SUT VICRYL 3-0 27" SH UNDYED

## (undated) DEVICE — FEEDING TUBE NG ARGYLE PVC 8FR 41CM

## (undated) DEVICE — DRSG TELFA 3 X 8

## (undated) DEVICE — SUT VICRYL 0 18" TIES UNDYED

## (undated) DEVICE — DRSG TEGADERM 4X4.75"

## (undated) DEVICE — LABELS BLANK W PEN

## (undated) DEVICE — SOL IRR POUR NS 0.9% 1500ML

## (undated) DEVICE — URETERAL CATH RED RUBBER 14FR (GREEN)

## (undated) DEVICE — CANISTER DISPOSABLE THIN WALL 3000CC

## (undated) DEVICE — FOLEY CATH 2-WAY 16FR 5CC SILICONE

## (undated) DEVICE — SUT VICRYL 2-0 18" TIES UNDYED

## (undated) DEVICE — CATH INSERTION TRAY W 10CC SYRINGE

## (undated) DEVICE — STAPLER ECHELON FLEX POWERED ADV PLCMT TIP

## (undated) DEVICE — PROTECTOR HEEL / ELBOW FLUFFY

## (undated) DEVICE — POOLE SUCTION TIP

## (undated) DEVICE — BLADE SURGICAL #15 CARBON

## (undated) DEVICE — STAPLER ECHELON FLEX POWERED PLUS 440MM

## (undated) DEVICE — SUT VICRYL 0 27" CT-1 UNDYED

## (undated) DEVICE — SUT VICRYL 3-0 54" REEL UNDYED

## (undated) DEVICE — ELCTR BOVIE TIP BLADE INSULATED 2.75" EDGE

## (undated) DEVICE — SUT PROLENE 3-0 36" MH